# Patient Record
Sex: FEMALE | Race: BLACK OR AFRICAN AMERICAN | NOT HISPANIC OR LATINO | Employment: FULL TIME | URBAN - METROPOLITAN AREA
[De-identification: names, ages, dates, MRNs, and addresses within clinical notes are randomized per-mention and may not be internally consistent; named-entity substitution may affect disease eponyms.]

---

## 2018-01-31 ENCOUNTER — OFFICE VISIT CONVERTED (OUTPATIENT)
Dept: ORTHOPEDIC SURGERY | Facility: CLINIC | Age: 58
End: 2018-01-31
Attending: ORTHOPAEDIC SURGERY

## 2018-11-29 ENCOUNTER — CONVERSION ENCOUNTER (OUTPATIENT)
Dept: GENERAL RADIOLOGY | Facility: HOSPITAL | Age: 58
End: 2018-11-29

## 2019-02-20 ENCOUNTER — HOSPITAL ENCOUNTER (OUTPATIENT)
Dept: GENERAL RADIOLOGY | Facility: HOSPITAL | Age: 59
Discharge: HOME | End: 2019-02-20
Attending: NURSE PRACTITIONER

## 2019-07-09 ENCOUNTER — HOSPITAL ENCOUNTER (OUTPATIENT)
Dept: GENERAL RADIOLOGY | Facility: HOSPITAL | Age: 59
Discharge: HOME | End: 2019-07-09
Admitting: ORTHOPAEDIC SURGERY

## 2019-07-09 ENCOUNTER — HOSPITAL ENCOUNTER (OUTPATIENT)
Dept: GENERAL RADIOLOGY | Facility: HOSPITAL | Age: 59
Discharge: HOME | End: 2019-07-09

## 2019-07-09 ENCOUNTER — APPOINTMENT (OUTPATIENT)
Dept: PREADMISSION TESTING | Facility: HOSPITAL | Age: 59
End: 2019-07-09

## 2019-07-09 VITALS
SYSTOLIC BLOOD PRESSURE: 114 MMHG | DIASTOLIC BLOOD PRESSURE: 77 MMHG | BODY MASS INDEX: 31.37 KG/M2 | HEART RATE: 90 BPM | HEIGHT: 68 IN | OXYGEN SATURATION: 98 % | TEMPERATURE: 98.2 F | RESPIRATION RATE: 16 BRPM | WEIGHT: 207 LBS

## 2019-07-09 LAB
ABO GROUP BLD: NORMAL
ALBUMIN SERPL-MCNC: 4.2 G/DL (ref 3.5–5.2)
ALBUMIN/GLOB SERPL: 1.4 G/DL
ALP SERPL-CCNC: 135 U/L (ref 39–117)
ALT SERPL W P-5'-P-CCNC: 32 U/L (ref 1–33)
ANION GAP SERPL CALCULATED.3IONS-SCNC: 11.8 MMOL/L (ref 5–15)
APTT PPP: 29.7 SECONDS (ref 22.7–35.4)
AST SERPL-CCNC: 23 U/L (ref 1–32)
BACTERIA UR QL AUTO: ABNORMAL /HPF
BASOPHILS # BLD AUTO: 0.04 10*3/MM3 (ref 0–0.2)
BASOPHILS NFR BLD AUTO: 0.4 % (ref 0–1.5)
BILIRUB SERPL-MCNC: 0.2 MG/DL (ref 0.2–1.2)
BILIRUB UR QL STRIP: NEGATIVE
BLD GP AB SCN SERPL QL: NEGATIVE
BUN BLD-MCNC: 19 MG/DL (ref 6–20)
BUN/CREAT SERPL: 25 (ref 7–25)
CALCIUM SPEC-SCNC: 9.6 MG/DL (ref 8.6–10.5)
CHLORIDE SERPL-SCNC: 105 MMOL/L (ref 98–107)
CLARITY UR: CLEAR
CO2 SERPL-SCNC: 27.2 MMOL/L (ref 22–29)
COLOR UR: YELLOW
CREAT BLD-MCNC: 0.76 MG/DL (ref 0.57–1)
DEPRECATED RDW RBC AUTO: 55.9 FL (ref 37–54)
EOSINOPHIL # BLD AUTO: 0.14 10*3/MM3 (ref 0–0.4)
EOSINOPHIL NFR BLD AUTO: 1.6 % (ref 0.3–6.2)
ERYTHROCYTE [DISTWIDTH] IN BLOOD BY AUTOMATED COUNT: 18.3 % (ref 12.3–15.4)
GFR SERPL CREATININE-BSD FRML MDRD: 95 ML/MIN/1.73
GLOBULIN UR ELPH-MCNC: 2.9 GM/DL
GLUCOSE BLD-MCNC: 102 MG/DL (ref 65–99)
GLUCOSE UR STRIP-MCNC: NEGATIVE MG/DL
HBA1C MFR BLD: 5.9 % (ref 4.8–5.6)
HCT VFR BLD AUTO: 43.8 % (ref 34–46.6)
HGB BLD-MCNC: 13.4 G/DL (ref 12–15.9)
HGB UR QL STRIP.AUTO: NEGATIVE
HYALINE CASTS UR QL AUTO: ABNORMAL /LPF
IMM GRANULOCYTES # BLD AUTO: 0.02 10*3/MM3 (ref 0–0.05)
IMM GRANULOCYTES NFR BLD AUTO: 0.2 % (ref 0–0.5)
INR PPP: 0.91 (ref 0.9–1.1)
KETONES UR QL STRIP: NEGATIVE
LEUKOCYTE ESTERASE UR QL STRIP.AUTO: ABNORMAL
LYMPHOCYTES # BLD AUTO: 2.88 10*3/MM3 (ref 0.7–3.1)
LYMPHOCYTES NFR BLD AUTO: 32.1 % (ref 19.6–45.3)
MCH RBC QN AUTO: 26.2 PG (ref 26.6–33)
MCHC RBC AUTO-ENTMCNC: 30.6 G/DL (ref 31.5–35.7)
MCV RBC AUTO: 85.7 FL (ref 79–97)
MONOCYTES # BLD AUTO: 0.6 10*3/MM3 (ref 0.1–0.9)
MONOCYTES NFR BLD AUTO: 6.7 % (ref 5–12)
NEUTROPHILS # BLD AUTO: 5.3 10*3/MM3 (ref 1.7–7)
NEUTROPHILS NFR BLD AUTO: 59 % (ref 42.7–76)
NITRITE UR QL STRIP: NEGATIVE
NRBC BLD AUTO-RTO: 0 /100 WBC (ref 0–0.2)
PH UR STRIP.AUTO: 5.5 [PH] (ref 5–8)
PLATELET # BLD AUTO: 290 10*3/MM3 (ref 140–450)
PMV BLD AUTO: 10.6 FL (ref 6–12)
POTASSIUM BLD-SCNC: 3.9 MMOL/L (ref 3.5–5.2)
PROT SERPL-MCNC: 7.1 G/DL (ref 6–8.5)
PROT UR QL STRIP: NEGATIVE
PROTHROMBIN TIME: 11.9 SECONDS (ref 11.7–14.2)
RBC # BLD AUTO: 5.11 10*6/MM3 (ref 3.77–5.28)
RBC # UR: ABNORMAL /HPF
REF LAB TEST METHOD: ABNORMAL
RH BLD: POSITIVE
SODIUM BLD-SCNC: 144 MMOL/L (ref 136–145)
SP GR UR STRIP: 1.03 (ref 1–1.03)
SQUAMOUS #/AREA URNS HPF: ABNORMAL /HPF
T&S EXPIRATION DATE: NORMAL
UROBILINOGEN UR QL STRIP: ABNORMAL
WBC NRBC COR # BLD: 8.98 10*3/MM3 (ref 3.4–10.8)
WBC UR QL AUTO: ABNORMAL /HPF

## 2019-07-09 PROCEDURE — 93005 ELECTROCARDIOGRAM TRACING: CPT

## 2019-07-09 PROCEDURE — 80053 COMPREHEN METABOLIC PANEL: CPT | Performed by: ORTHOPAEDIC SURGERY

## 2019-07-09 PROCEDURE — 36415 COLL VENOUS BLD VENIPUNCTURE: CPT

## 2019-07-09 PROCEDURE — 85025 COMPLETE CBC W/AUTO DIFF WBC: CPT | Performed by: ORTHOPAEDIC SURGERY

## 2019-07-09 PROCEDURE — 93010 ELECTROCARDIOGRAM REPORT: CPT | Performed by: INTERNAL MEDICINE

## 2019-07-09 PROCEDURE — 86901 BLOOD TYPING SEROLOGIC RH(D): CPT | Performed by: ORTHOPAEDIC SURGERY

## 2019-07-09 PROCEDURE — 83036 HEMOGLOBIN GLYCOSYLATED A1C: CPT | Performed by: ORTHOPAEDIC SURGERY

## 2019-07-09 PROCEDURE — 86900 BLOOD TYPING SEROLOGIC ABO: CPT | Performed by: ORTHOPAEDIC SURGERY

## 2019-07-09 PROCEDURE — 71046 X-RAY EXAM CHEST 2 VIEWS: CPT

## 2019-07-09 PROCEDURE — 81001 URINALYSIS AUTO W/SCOPE: CPT | Performed by: ORTHOPAEDIC SURGERY

## 2019-07-09 PROCEDURE — 86850 RBC ANTIBODY SCREEN: CPT | Performed by: ORTHOPAEDIC SURGERY

## 2019-07-09 PROCEDURE — 85610 PROTHROMBIN TIME: CPT | Performed by: ORTHOPAEDIC SURGERY

## 2019-07-09 PROCEDURE — 73560 X-RAY EXAM OF KNEE 1 OR 2: CPT

## 2019-07-09 PROCEDURE — 85730 THROMBOPLASTIN TIME PARTIAL: CPT | Performed by: ORTHOPAEDIC SURGERY

## 2019-07-09 RX ORDER — FLUTICASONE PROPIONATE 50 MCG
2 SPRAY, SUSPENSION (ML) NASAL DAILY
COMMUNITY

## 2019-07-09 RX ORDER — PRAVASTATIN SODIUM 10 MG
10 TABLET ORAL DAILY
COMMUNITY

## 2019-07-09 RX ORDER — ESOMEPRAZOLE MAGNESIUM 40 MG/1
40 CAPSULE, DELAYED RELEASE ORAL
COMMUNITY

## 2019-07-09 RX ORDER — HYDROCODONE BITARTRATE AND ACETAMINOPHEN 10; 325 MG/1; MG/1
1 TABLET ORAL EVERY 6 HOURS PRN
COMMUNITY

## 2019-07-09 RX ORDER — CETIRIZINE HYDROCHLORIDE 10 MG/1
10 TABLET ORAL DAILY
COMMUNITY

## 2019-07-09 RX ORDER — CHLORHEXIDINE GLUCONATE 500 MG/1
1 CLOTH TOPICAL TAKE AS DIRECTED
Status: ON HOLD | COMMUNITY
End: 2019-07-16

## 2019-07-09 ASSESSMENT — KOOS JR
KOOS JR SCORE: 24
KOOS JR SCORE: 24.875

## 2019-07-09 NOTE — DISCHARGE INSTRUCTIONS
Take the following medications the morning of surgery with a small sip of water:  NEXIUM    ARRIVE AT 0730.        General Instructions:  • Do not eat solid food after midnight the night before surgery.  • You may drink clear liquids day of surgery but must stop at least one hour before your hospital arrival time.  • It is beneficial for you to have a clear drink that contains carbohydrates the day of surgery.  We suggest a 12 to 20 ounce bottle of Gatorade or Powerade for non-diabetic patients or a 12 to 20 ounce bottle of G2 or Powerade Zero for diabetic patients. (Pediatric patients, are not advised to drink a 12 to 20 ounce carbohydrate drink)    Clear liquids are liquids you can see through.  Nothing red in color.     Plain water                               Sports drinks  Sodas                                   Gelatin (Jell-O)  Fruit juices without pulp such as white grape juice and apple juice  Popsicles that contain no fruit or yogurt  Tea or coffee (no cream or milk added)  Gatorade / Powerade  G2 / Powerade Zero    • Infants may have breast milk up to four hours before surgery.  • Infants drinking formula may drink formula up to six hours before surgery.   • Patients who avoid smoking, chewing tobacco and alcohol for 4 weeks prior to surgery have a reduced risk of post-operative complications.  Quit smoking as many days before surgery as you can.  • Do not smoke, use chewing tobacco or drink alcohol the day of surgery.   • If applicable bring your C-PAP/ BI-PAP machine.  • Bring any papers given to you in the doctor’s office.  • Wear clean comfortable clothes and socks.  • Do not wear contact lenses, false eyelashes or make-up.  Bring a case for your glasses.   • Bring crutches or walker if applicable.  • Remove all piercings.  Leave jewelry and any other valuables at home.  • Hair extensions with metal clips must be removed prior to surgery.  • The Pre-Admission Testing nurse will instruct you to bring  medications if unable to obtain an accurate list in Pre-Admission Testing.        If you were given a blood bank ID arm band remember to bring it with you the day of surgery.    Preventing a Surgical Site Infection:  • For 2 to 3 days before surgery, avoid shaving with a razor because the razor can irritate skin and make it easier to develop an infection.    • Any areas of open skin can increase the risk of a post-operative wound infection by allowing bacteria to enter and travel throughout the body.  Notify your surgeon if you have any skin wounds / rashes even if it is not near the expected surgical site.  The area will need assessed to determine if surgery should be delayed until it is healed.  • The night prior to surgery sleep in a clean bed with clean clothing.  Do not allow pets to sleep with you.  • Shower on the morning of surgery using a fresh bar of anti-bacterial soap (such as Dial) and clean washcloth.  Dry with a clean towel and dress in clean clothing.  • Ask your surgeon if you will be receiving antibiotics prior to surgery.  • Make sure you, your family, and all healthcare providers clean their hands with soap and water or an alcohol based hand  before caring for you or your wound.    Day of surgery:  Upon arrival, a Pre-op nurse and Anesthesiologist will review your health history, obtain vital signs, and answer questions you may have.  The only belongings needed at this time will be a list of your home medications and if applicable your C-PAP/BI-PAP machine.  If you are staying overnight your family can leave the rest of your belongings in the car and bring them to your room later.  A Pre-op nurse will start an IV and you may receive medication in preparation for surgery, including something to help you relax.  Your family will be able to see you in the Pre-op area.  While you are in surgery your family should notify the waiting room  if they leave the waiting room area and  provide a contact phone number.    Please be aware that surgery does come with discomfort.  We want to make every effort to control your discomfort so please discuss any uncontrolled symptoms with your nurse.   Your doctor will most likely have prescribed pain medications.      If you are going home after surgery you will receive individualized written care instructions before being discharged.  A responsible adult must drive you to and from the hospital on the day of your surgery and stay with you for 24 hours.    If you are staying overnight following surgery, you will be transported to your hospital room following the recovery period.  [facility] has all private rooms.    You have received a list of surgical assistants for your reference.  If you have any questions please call Pre-Admission Testing at [phone].  Deductibles and co-payments are collected on the day of service. Please be prepared to pay the required co-pay, deductible or deposit on the day of service as defined by your plan.    2% CHLORAHEXIDINE GLUCONATE* CLOTH  Preparing or “prepping” skin before surgery can reduce the risk of infection at the surgical site. To make the process easier, [facility] has chosen disposable cloths moistened with a rinse-free, 2% Chlorhexidine Gluconate (CHG) antiseptic solution. The steps below outline the prepping process and should be carefully followed.        Use the prep cloth on the area that is circled in the diagram             Directions Night before Surgery  1) Shower using a fresh bar of anti-bacterial soap (such as Dial) and clean washcloth.  Use a clean towel to completely dry your skin.  2) Do not use any lotions, oils or creams on your skin.  3) Open the package and remove 1 cloth, wipe your skin for 30 seconds in a circular motion.  Allow to dry for 3 minutes.  4) Repeat #3 with second cloth.  5) Do not touch your eyes, ears, or mouth with the prep cloth.  6) Allow the wet  prep solution to air dry.  7) Discard the prep cloth and wash your hands with soap and water.   8) Dress in clean bed clothes and sleep on fresh clean bed sheets.   9) You may experience some temporary itching after the prep.    Directions Day of Surgery  1) Repeat steps 1,2,3,4,5,6,7, and 9.   2) Dress in clean clothes before coming to the hospital.    BACTROBAN NASAL OINTMENT  There are many germs normally in your nose. Bactroban is an ointment that will help reduce these germs. Please follow these instructions for Bactroban use:      ____The day before surgery in the morning  Date________    ____The day before surgery in the evening              Date________    ____The day of surgery in the morning    Date________    **Squirt ½ package of Bactroban Ointment onto a cotton applicator and apply to inside of 1st nostril.  Squirt the remaining Bactroban and apply to the inside of the other nostril.

## 2019-07-16 ENCOUNTER — ANESTHESIA EVENT (OUTPATIENT)
Dept: PERIOP | Facility: HOSPITAL | Age: 59
End: 2019-07-16

## 2019-07-16 ENCOUNTER — APPOINTMENT (OUTPATIENT)
Dept: GENERAL RADIOLOGY | Facility: HOSPITAL | Age: 59
End: 2019-07-16

## 2019-07-16 ENCOUNTER — HOSPITAL ENCOUNTER (INPATIENT)
Facility: HOSPITAL | Age: 59
LOS: 1 days | Discharge: HOME HEALTH | End: 2019-07-17
Attending: ORTHOPAEDIC SURGERY | Admitting: ORTHOPAEDIC SURGERY

## 2019-07-16 ENCOUNTER — ANESTHESIA (OUTPATIENT)
Dept: PERIOP | Facility: HOSPITAL | Age: 59
End: 2019-07-16

## 2019-07-16 DIAGNOSIS — R26.89 DECREASED MOBILITY: Primary | ICD-10-CM

## 2019-07-16 PROBLEM — Z96.651 STATUS POST TOTAL KNEE REPLACEMENT, RIGHT: Status: ACTIVE | Noted: 2019-07-16

## 2019-07-16 LAB
GLUCOSE BLDC GLUCOMTR-MCNC: 106 MG/DL (ref 70–130)
GLUCOSE BLDC GLUCOMTR-MCNC: 171 MG/DL (ref 70–130)

## 2019-07-16 PROCEDURE — 99254 IP/OBS CNSLTJ NEW/EST MOD 60: CPT | Performed by: INTERNAL MEDICINE

## 2019-07-16 PROCEDURE — 25010000002 MIDAZOLAM PER 1 MG: Performed by: ANESTHESIOLOGY

## 2019-07-16 PROCEDURE — 25010000003 BUPIVACAINE LIPOSOME 1.3 % SUSPENSION 20 ML VIAL: Performed by: ORTHOPAEDIC SURGERY

## 2019-07-16 PROCEDURE — 25010000002 PROMETHAZINE PER 50 MG: Performed by: ORTHOPAEDIC SURGERY

## 2019-07-16 PROCEDURE — 0SRC0J9 REPLACEMENT OF RIGHT KNEE JOINT WITH SYNTHETIC SUBSTITUTE, CEMENTED, OPEN APPROACH: ICD-10-PCS | Performed by: ORTHOPAEDIC SURGERY

## 2019-07-16 PROCEDURE — 25010000002 HYDROMORPHONE PER 4 MG: Performed by: NURSE ANESTHETIST, CERTIFIED REGISTERED

## 2019-07-16 PROCEDURE — 82962 GLUCOSE BLOOD TEST: CPT

## 2019-07-16 PROCEDURE — 25010000002 FENTANYL CITRATE (PF) 100 MCG/2ML SOLUTION: Performed by: NURSE ANESTHETIST, CERTIFIED REGISTERED

## 2019-07-16 PROCEDURE — C1776 JOINT DEVICE (IMPLANTABLE): HCPCS | Performed by: ORTHOPAEDIC SURGERY

## 2019-07-16 PROCEDURE — 25010000002 ONDANSETRON PER 1 MG: Performed by: NURSE ANESTHETIST, CERTIFIED REGISTERED

## 2019-07-16 PROCEDURE — C1713 ANCHOR/SCREW BN/BN,TIS/BN: HCPCS | Performed by: ORTHOPAEDIC SURGERY

## 2019-07-16 PROCEDURE — 73560 X-RAY EXAM OF KNEE 1 OR 2: CPT

## 2019-07-16 PROCEDURE — 25010000002 PROPOFOL 10 MG/ML EMULSION: Performed by: NURSE ANESTHETIST, CERTIFIED REGISTERED

## 2019-07-16 PROCEDURE — C9290 INJ, BUPIVACAINE LIPOSOME: HCPCS | Performed by: ORTHOPAEDIC SURGERY

## 2019-07-16 PROCEDURE — 25010000002 DEXAMETHASONE PER 1 MG: Performed by: NURSE ANESTHETIST, CERTIFIED REGISTERED

## 2019-07-16 PROCEDURE — 25010000002 FENTANYL CITRATE (PF) 100 MCG/2ML SOLUTION: Performed by: ANESTHESIOLOGY

## 2019-07-16 PROCEDURE — 63710000001 INSULIN LISPRO (HUMAN) PER 5 UNITS: Performed by: ORTHOPAEDIC SURGERY

## 2019-07-16 PROCEDURE — 25010000002 HYDROMORPHONE PER 4 MG: Performed by: ORTHOPAEDIC SURGERY

## 2019-07-16 PROCEDURE — 25010000002 ROPIVACAINE PER 1 MG: Performed by: ANESTHESIOLOGY

## 2019-07-16 PROCEDURE — 25010000002 HYDRALAZINE PER 20 MG: Performed by: NURSE ANESTHETIST, CERTIFIED REGISTERED

## 2019-07-16 PROCEDURE — 25010000003 CEFAZOLIN IN DEXTROSE 2-4 GM/100ML-% SOLUTION: Performed by: ORTHOPAEDIC SURGERY

## 2019-07-16 DEVICE — CAP EXT STEM KN UPCHRG: Type: IMPLANTABLE DEVICE | Site: KNEE | Status: FUNCTIONAL

## 2019-07-16 DEVICE — IMPLANTABLE DEVICE
Type: IMPLANTABLE DEVICE | Site: KNEE | Status: FUNCTIONAL
Brand: PERSONA®

## 2019-07-16 DEVICE — CAP TOTL KN CMT PREMIUM: Type: IMPLANTABLE DEVICE | Site: KNEE | Status: FUNCTIONAL

## 2019-07-16 DEVICE — CMT BONE REFOBACIN R W/GENT 1X40: Type: IMPLANTABLE DEVICE | Site: KNEE | Status: FUNCTIONAL

## 2019-07-16 DEVICE — CAP PAT KN VE/TM UPCHRG: Type: IMPLANTABLE DEVICE | Site: KNEE | Status: FUNCTIONAL

## 2019-07-16 DEVICE — CAP BEAR KN VE UPCHRG: Type: IMPLANTABLE DEVICE | Site: KNEE | Status: FUNCTIONAL

## 2019-07-16 DEVICE — IMPLANTABLE DEVICE
Type: IMPLANTABLE DEVICE | Site: KNEE | Status: FUNCTIONAL
Brand: PERSONA® VIVACIT-E®

## 2019-07-16 DEVICE — IMPLANTABLE DEVICE
Type: IMPLANTABLE DEVICE | Site: KNEE | Status: FUNCTIONAL
Brand: PERSONA® NATURAL TIBIA®

## 2019-07-16 RX ORDER — ACETAMINOPHEN 325 MG/1
650 TABLET ORAL ONCE AS NEEDED
Status: DISCONTINUED | OUTPATIENT
Start: 2019-07-16 | End: 2019-07-16 | Stop reason: HOSPADM

## 2019-07-16 RX ORDER — SENNA AND DOCUSATE SODIUM 50; 8.6 MG/1; MG/1
1 TABLET, FILM COATED ORAL 2 TIMES DAILY
Status: DISCONTINUED | OUTPATIENT
Start: 2019-07-16 | End: 2019-07-17 | Stop reason: HOSPADM

## 2019-07-16 RX ORDER — FLUTICASONE PROPIONATE 50 MCG
2 SPRAY, SUSPENSION (ML) NASAL DAILY
Status: DISCONTINUED | OUTPATIENT
Start: 2019-07-16 | End: 2019-07-17 | Stop reason: HOSPADM

## 2019-07-16 RX ORDER — HYDRALAZINE HYDROCHLORIDE 20 MG/ML
5 INJECTION INTRAMUSCULAR; INTRAVENOUS
Status: DISCONTINUED | OUTPATIENT
Start: 2019-07-16 | End: 2019-07-16 | Stop reason: HOSPADM

## 2019-07-16 RX ORDER — CEFAZOLIN SODIUM 2 G/100ML
2 INJECTION, SOLUTION INTRAVENOUS ONCE
Status: COMPLETED | OUTPATIENT
Start: 2019-07-16 | End: 2019-07-16

## 2019-07-16 RX ORDER — HYDROMORPHONE HYDROCHLORIDE 1 MG/ML
0.5 INJECTION, SOLUTION INTRAMUSCULAR; INTRAVENOUS; SUBCUTANEOUS
Status: DISCONTINUED | OUTPATIENT
Start: 2019-07-16 | End: 2019-07-17 | Stop reason: HOSPADM

## 2019-07-16 RX ORDER — MAGNESIUM HYDROXIDE 1200 MG/15ML
LIQUID ORAL AS NEEDED
Status: DISCONTINUED | OUTPATIENT
Start: 2019-07-16 | End: 2019-07-16 | Stop reason: HOSPADM

## 2019-07-16 RX ORDER — FENTANYL CITRATE 50 UG/ML
INJECTION, SOLUTION INTRAMUSCULAR; INTRAVENOUS AS NEEDED
Status: DISCONTINUED | OUTPATIENT
Start: 2019-07-16 | End: 2019-07-16 | Stop reason: SURG

## 2019-07-16 RX ORDER — DIPHENHYDRAMINE HYDROCHLORIDE 50 MG/ML
12.5 INJECTION INTRAMUSCULAR; INTRAVENOUS
Status: DISCONTINUED | OUTPATIENT
Start: 2019-07-16 | End: 2019-07-16 | Stop reason: HOSPADM

## 2019-07-16 RX ORDER — SODIUM CHLORIDE, SODIUM LACTATE, POTASSIUM CHLORIDE, CALCIUM CHLORIDE 600; 310; 30; 20 MG/100ML; MG/100ML; MG/100ML; MG/100ML
100 INJECTION, SOLUTION INTRAVENOUS CONTINUOUS
Status: ACTIVE | OUTPATIENT
Start: 2019-07-16 | End: 2019-07-17

## 2019-07-16 RX ORDER — HYDROMORPHONE HYDROCHLORIDE 1 MG/ML
0.5 INJECTION, SOLUTION INTRAMUSCULAR; INTRAVENOUS; SUBCUTANEOUS
Status: DISCONTINUED | OUTPATIENT
Start: 2019-07-16 | End: 2019-07-16 | Stop reason: HOSPADM

## 2019-07-16 RX ORDER — PROMETHAZINE HYDROCHLORIDE 25 MG/ML
12.5 INJECTION, SOLUTION INTRAMUSCULAR; INTRAVENOUS ONCE AS NEEDED
Status: DISCONTINUED | OUTPATIENT
Start: 2019-07-16 | End: 2019-07-16 | Stop reason: HOSPADM

## 2019-07-16 RX ORDER — FAMOTIDINE 10 MG/ML
20 INJECTION, SOLUTION INTRAVENOUS ONCE
Status: COMPLETED | OUTPATIENT
Start: 2019-07-16 | End: 2019-07-16

## 2019-07-16 RX ORDER — OXYCODONE AND ACETAMINOPHEN 7.5; 325 MG/1; MG/1
1 TABLET ORAL ONCE AS NEEDED
Status: DISCONTINUED | OUTPATIENT
Start: 2019-07-16 | End: 2019-07-16 | Stop reason: HOSPADM

## 2019-07-16 RX ORDER — SODIUM CHLORIDE 0.9 % (FLUSH) 0.9 %
1-10 SYRINGE (ML) INJECTION AS NEEDED
Status: DISCONTINUED | OUTPATIENT
Start: 2019-07-16 | End: 2019-07-16 | Stop reason: HOSPADM

## 2019-07-16 RX ORDER — FENTANYL CITRATE 50 UG/ML
50 INJECTION, SOLUTION INTRAMUSCULAR; INTRAVENOUS
Status: DISCONTINUED | OUTPATIENT
Start: 2019-07-16 | End: 2019-07-16 | Stop reason: HOSPADM

## 2019-07-16 RX ORDER — ONDANSETRON 2 MG/ML
4 INJECTION INTRAMUSCULAR; INTRAVENOUS EVERY 6 HOURS PRN
Status: DISCONTINUED | OUTPATIENT
Start: 2019-07-16 | End: 2019-07-17 | Stop reason: HOSPADM

## 2019-07-16 RX ORDER — HYDROMORPHONE HCL 110MG/55ML
PATIENT CONTROLLED ANALGESIA SYRINGE INTRAVENOUS AS NEEDED
Status: DISCONTINUED | OUTPATIENT
Start: 2019-07-16 | End: 2019-07-16 | Stop reason: SURG

## 2019-07-16 RX ORDER — FLUMAZENIL 0.1 MG/ML
0.2 INJECTION INTRAVENOUS AS NEEDED
Status: DISCONTINUED | OUTPATIENT
Start: 2019-07-16 | End: 2019-07-16 | Stop reason: HOSPADM

## 2019-07-16 RX ORDER — CEFAZOLIN SODIUM 2 G/100ML
2 INJECTION, SOLUTION INTRAVENOUS EVERY 8 HOURS
Status: COMPLETED | OUTPATIENT
Start: 2019-07-16 | End: 2019-07-17

## 2019-07-16 RX ORDER — SODIUM CHLORIDE, SODIUM LACTATE, POTASSIUM CHLORIDE, CALCIUM CHLORIDE 600; 310; 30; 20 MG/100ML; MG/100ML; MG/100ML; MG/100ML
9 INJECTION, SOLUTION INTRAVENOUS CONTINUOUS
Status: DISCONTINUED | OUTPATIENT
Start: 2019-07-16 | End: 2019-07-16

## 2019-07-16 RX ORDER — MULTIPLE VITAMINS W/ MINERALS TAB 9MG-400MCG
1 TAB ORAL DAILY
Status: DISCONTINUED | OUTPATIENT
Start: 2019-07-16 | End: 2019-07-17 | Stop reason: HOSPADM

## 2019-07-16 RX ORDER — PROMETHAZINE HYDROCHLORIDE 25 MG/ML
6.25 INJECTION, SOLUTION INTRAMUSCULAR; INTRAVENOUS
Status: DISCONTINUED | OUTPATIENT
Start: 2019-07-16 | End: 2019-07-16 | Stop reason: HOSPADM

## 2019-07-16 RX ORDER — PROMETHAZINE HYDROCHLORIDE 25 MG/1
25 SUPPOSITORY RECTAL ONCE AS NEEDED
Status: DISCONTINUED | OUTPATIENT
Start: 2019-07-16 | End: 2019-07-16 | Stop reason: HOSPADM

## 2019-07-16 RX ORDER — LIDOCAINE HYDROCHLORIDE 20 MG/ML
INJECTION, SOLUTION INFILTRATION; PERINEURAL AS NEEDED
Status: DISCONTINUED | OUTPATIENT
Start: 2019-07-16 | End: 2019-07-16 | Stop reason: SURG

## 2019-07-16 RX ORDER — PRAVASTATIN SODIUM 10 MG
10 TABLET ORAL DAILY
Status: DISCONTINUED | OUTPATIENT
Start: 2019-07-16 | End: 2019-07-17 | Stop reason: HOSPADM

## 2019-07-16 RX ORDER — LIDOCAINE HYDROCHLORIDE 10 MG/ML
0.5 INJECTION, SOLUTION EPIDURAL; INFILTRATION; INTRACAUDAL; PERINEURAL ONCE AS NEEDED
Status: DISCONTINUED | OUTPATIENT
Start: 2019-07-16 | End: 2019-07-16 | Stop reason: HOSPADM

## 2019-07-16 RX ORDER — OXYCODONE AND ACETAMINOPHEN 7.5; 325 MG/1; MG/1
2 TABLET ORAL EVERY 4 HOURS PRN
Status: DISCONTINUED | OUTPATIENT
Start: 2019-07-16 | End: 2019-07-17 | Stop reason: HOSPADM

## 2019-07-16 RX ORDER — OXYCODONE AND ACETAMINOPHEN 7.5; 325 MG/1; MG/1
1 TABLET ORAL EVERY 4 HOURS PRN
Status: DISCONTINUED | OUTPATIENT
Start: 2019-07-16 | End: 2019-07-17 | Stop reason: HOSPADM

## 2019-07-16 RX ORDER — ONDANSETRON 4 MG/1
4 TABLET, FILM COATED ORAL EVERY 6 HOURS PRN
Status: DISCONTINUED | OUTPATIENT
Start: 2019-07-16 | End: 2019-07-17 | Stop reason: HOSPADM

## 2019-07-16 RX ORDER — NICOTINE POLACRILEX 4 MG
15 LOZENGE BUCCAL
Status: DISCONTINUED | OUTPATIENT
Start: 2019-07-16 | End: 2019-07-17 | Stop reason: HOSPADM

## 2019-07-16 RX ORDER — PANTOPRAZOLE SODIUM 40 MG/1
40 TABLET, DELAYED RELEASE ORAL EVERY MORNING
Status: DISCONTINUED | OUTPATIENT
Start: 2019-07-17 | End: 2019-07-17 | Stop reason: HOSPADM

## 2019-07-16 RX ORDER — LABETALOL HYDROCHLORIDE 5 MG/ML
5 INJECTION, SOLUTION INTRAVENOUS
Status: DISCONTINUED | OUTPATIENT
Start: 2019-07-16 | End: 2019-07-16 | Stop reason: HOSPADM

## 2019-07-16 RX ORDER — PROMETHAZINE HYDROCHLORIDE 25 MG/ML
12.5 INJECTION, SOLUTION INTRAMUSCULAR; INTRAVENOUS EVERY 6 HOURS PRN
Status: DISCONTINUED | OUTPATIENT
Start: 2019-07-16 | End: 2019-07-17 | Stop reason: HOSPADM

## 2019-07-16 RX ORDER — ONDANSETRON 2 MG/ML
4 INJECTION INTRAMUSCULAR; INTRAVENOUS ONCE AS NEEDED
Status: DISCONTINUED | OUTPATIENT
Start: 2019-07-16 | End: 2019-07-16 | Stop reason: HOSPADM

## 2019-07-16 RX ORDER — ONDANSETRON 2 MG/ML
INJECTION INTRAMUSCULAR; INTRAVENOUS AS NEEDED
Status: DISCONTINUED | OUTPATIENT
Start: 2019-07-16 | End: 2019-07-16 | Stop reason: SURG

## 2019-07-16 RX ORDER — HYDROCODONE BITARTRATE AND ACETAMINOPHEN 7.5; 325 MG/1; MG/1
1 TABLET ORAL ONCE AS NEEDED
Status: DISCONTINUED | OUTPATIENT
Start: 2019-07-16 | End: 2019-07-16 | Stop reason: HOSPADM

## 2019-07-16 RX ORDER — ROPIVACAINE HYDROCHLORIDE 5 MG/ML
INJECTION, SOLUTION EPIDURAL; INFILTRATION; PERINEURAL
Status: COMPLETED | OUTPATIENT
Start: 2019-07-16 | End: 2019-07-16

## 2019-07-16 RX ORDER — DEXTROSE MONOHYDRATE 25 G/50ML
25 INJECTION, SOLUTION INTRAVENOUS
Status: DISCONTINUED | OUTPATIENT
Start: 2019-07-16 | End: 2019-07-17 | Stop reason: HOSPADM

## 2019-07-16 RX ORDER — PROPOFOL 10 MG/ML
VIAL (ML) INTRAVENOUS AS NEEDED
Status: DISCONTINUED | OUTPATIENT
Start: 2019-07-16 | End: 2019-07-16 | Stop reason: SURG

## 2019-07-16 RX ORDER — MELATONIN
5000 DAILY
Status: DISCONTINUED | OUTPATIENT
Start: 2019-07-16 | End: 2019-07-17 | Stop reason: HOSPADM

## 2019-07-16 RX ORDER — PROMETHAZINE HYDROCHLORIDE 25 MG/1
25 TABLET ORAL ONCE AS NEEDED
Status: DISCONTINUED | OUTPATIENT
Start: 2019-07-16 | End: 2019-07-16 | Stop reason: HOSPADM

## 2019-07-16 RX ORDER — CETIRIZINE HYDROCHLORIDE 10 MG/1
10 TABLET ORAL DAILY
Status: DISCONTINUED | OUTPATIENT
Start: 2019-07-16 | End: 2019-07-17 | Stop reason: HOSPADM

## 2019-07-16 RX ORDER — EPHEDRINE SULFATE 50 MG/ML
5 INJECTION, SOLUTION INTRAVENOUS ONCE AS NEEDED
Status: DISCONTINUED | OUTPATIENT
Start: 2019-07-16 | End: 2019-07-16 | Stop reason: HOSPADM

## 2019-07-16 RX ORDER — MIDAZOLAM HYDROCHLORIDE 1 MG/ML
1 INJECTION INTRAMUSCULAR; INTRAVENOUS
Status: DISCONTINUED | OUTPATIENT
Start: 2019-07-16 | End: 2019-07-16 | Stop reason: HOSPADM

## 2019-07-16 RX ORDER — NALOXONE HCL 0.4 MG/ML
0.2 VIAL (ML) INJECTION AS NEEDED
Status: DISCONTINUED | OUTPATIENT
Start: 2019-07-16 | End: 2019-07-16 | Stop reason: HOSPADM

## 2019-07-16 RX ORDER — TRANEXAMIC ACID 100 MG/ML
INJECTION, SOLUTION INTRAVENOUS AS NEEDED
Status: DISCONTINUED | OUTPATIENT
Start: 2019-07-16 | End: 2019-07-16 | Stop reason: HOSPADM

## 2019-07-16 RX ORDER — DIAZEPAM 5 MG/1
5 TABLET ORAL EVERY 6 HOURS PRN
Status: DISCONTINUED | OUTPATIENT
Start: 2019-07-16 | End: 2019-07-17 | Stop reason: HOSPADM

## 2019-07-16 RX ORDER — DIPHENHYDRAMINE HCL 25 MG
25 CAPSULE ORAL
Status: DISCONTINUED | OUTPATIENT
Start: 2019-07-16 | End: 2019-07-16 | Stop reason: HOSPADM

## 2019-07-16 RX ORDER — DEXAMETHASONE SODIUM PHOSPHATE 10 MG/ML
INJECTION INTRAMUSCULAR; INTRAVENOUS AS NEEDED
Status: DISCONTINUED | OUTPATIENT
Start: 2019-07-16 | End: 2019-07-16 | Stop reason: SURG

## 2019-07-16 RX ORDER — MIDAZOLAM HYDROCHLORIDE 1 MG/ML
2 INJECTION INTRAMUSCULAR; INTRAVENOUS
Status: DISCONTINUED | OUTPATIENT
Start: 2019-07-16 | End: 2019-07-16 | Stop reason: HOSPADM

## 2019-07-16 RX ORDER — NALOXONE HCL 0.4 MG/ML
0.1 VIAL (ML) INJECTION
Status: DISCONTINUED | OUTPATIENT
Start: 2019-07-16 | End: 2019-07-17 | Stop reason: HOSPADM

## 2019-07-16 RX ADMIN — PROMETHAZINE HYDROCHLORIDE 12.5 MG: 25 INJECTION INTRAMUSCULAR; INTRAVENOUS at 18:59

## 2019-07-16 RX ADMIN — CEFAZOLIN SODIUM 2 G: 2 INJECTION, SOLUTION INTRAVENOUS at 22:25

## 2019-07-16 RX ADMIN — ROPIVACAINE HYDROCHLORIDE 20 ML: 5 INJECTION, SOLUTION EPIDURAL; INFILTRATION; PERINEURAL at 13:55

## 2019-07-16 RX ADMIN — FENTANYL CITRATE 50 MCG: 50 INJECTION INTRAMUSCULAR; INTRAVENOUS at 17:27

## 2019-07-16 RX ADMIN — MIDAZOLAM 1 MG: 1 INJECTION INTRAMUSCULAR; INTRAVENOUS at 13:50

## 2019-07-16 RX ADMIN — PRAVASTATIN SODIUM 10 MG: 10 TABLET ORAL at 22:18

## 2019-07-16 RX ADMIN — SENNOSIDES,DOCUSATE SODIUM 1 TABLET: 50; 8.6 TABLET, FILM COATED ORAL at 22:19

## 2019-07-16 RX ADMIN — ONDANSETRON 4 MG: 2 INJECTION INTRAMUSCULAR; INTRAVENOUS at 16:03

## 2019-07-16 RX ADMIN — DEXAMETHASONE SODIUM PHOSPHATE 8 MG: 10 INJECTION INTRAMUSCULAR; INTRAVENOUS at 14:44

## 2019-07-16 RX ADMIN — FENTANYL CITRATE 50 MCG: 50 INJECTION, SOLUTION INTRAMUSCULAR; INTRAVENOUS at 16:15

## 2019-07-16 RX ADMIN — FENTANYL CITRATE 50 MCG: 50 INJECTION, SOLUTION INTRAMUSCULAR; INTRAVENOUS at 16:10

## 2019-07-16 RX ADMIN — OXYCODONE HYDROCHLORIDE AND ACETAMINOPHEN 2 TABLET: 7.5; 325 TABLET ORAL at 22:19

## 2019-07-16 RX ADMIN — HYDRALAZINE HYDROCHLORIDE 5 MG: 20 INJECTION INTRAMUSCULAR; INTRAVENOUS at 16:59

## 2019-07-16 RX ADMIN — HYDRALAZINE HYDROCHLORIDE 5 MG: 20 INJECTION INTRAMUSCULAR; INTRAVENOUS at 16:43

## 2019-07-16 RX ADMIN — PROPOFOL 200 MG: 10 INJECTION, EMULSION INTRAVENOUS at 14:39

## 2019-07-16 RX ADMIN — FENTANYL CITRATE 50 MCG: 50 INJECTION, SOLUTION INTRAMUSCULAR; INTRAVENOUS at 14:47

## 2019-07-16 RX ADMIN — CEFAZOLIN SODIUM 2 G: 2 INJECTION, SOLUTION INTRAVENOUS at 14:34

## 2019-07-16 RX ADMIN — SODIUM CHLORIDE, POTASSIUM CHLORIDE, SODIUM LACTATE AND CALCIUM CHLORIDE 9 ML/HR: 600; 310; 30; 20 INJECTION, SOLUTION INTRAVENOUS at 12:41

## 2019-07-16 RX ADMIN — HYDROMORPHONE HYDROCHLORIDE 1 MG: 2 INJECTION INTRAMUSCULAR; INTRAVENOUS; SUBCUTANEOUS at 15:04

## 2019-07-16 RX ADMIN — FENTANYL CITRATE 100 MCG: 50 INJECTION, SOLUTION INTRAMUSCULAR; INTRAVENOUS at 14:36

## 2019-07-16 RX ADMIN — FAMOTIDINE 20 MG: 10 INJECTION INTRAVENOUS at 13:32

## 2019-07-16 RX ADMIN — INSULIN LISPRO 2 UNITS: 100 INJECTION, SOLUTION INTRAVENOUS; SUBCUTANEOUS at 22:19

## 2019-07-16 RX ADMIN — LIDOCAINE HYDROCHLORIDE 100 MG: 20 INJECTION, SOLUTION INFILTRATION; PERINEURAL at 14:39

## 2019-07-16 RX ADMIN — SODIUM CHLORIDE, POTASSIUM CHLORIDE, SODIUM LACTATE AND CALCIUM CHLORIDE: 600; 310; 30; 20 INJECTION, SOLUTION INTRAVENOUS at 16:16

## 2019-07-16 RX ADMIN — FENTANYL CITRATE 50 MCG: 50 INJECTION INTRAMUSCULAR; INTRAVENOUS at 13:50

## 2019-07-16 RX ADMIN — MIDAZOLAM 2 MG: 1 INJECTION INTRAMUSCULAR; INTRAVENOUS at 13:31

## 2019-07-16 RX ADMIN — HYDROMORPHONE HYDROCHLORIDE 0.5 MG: 1 INJECTION, SOLUTION INTRAMUSCULAR; INTRAVENOUS; SUBCUTANEOUS at 18:59

## 2019-07-16 NOTE — PLAN OF CARE
Problem: Patient Care Overview  Goal: Plan of Care Review  Outcome: Ongoing (interventions implemented as appropriate)   07/16/19 6311   Coping/Psychosocial   Plan of Care Reviewed With patient   Plan of Care Review   Progress improving   OTHER   Outcome Summary post op pain and nausea, ambulating assist of 1 with walker to BRP, BMx1-diarrhea, NVI, VSS, NVI, educated on bp meds and monitoring     Goal: Individualization and Mutuality  Outcome: Ongoing (interventions implemented as appropriate)      Problem: Fall Risk (Adult)  Goal: Identify Related Risk Factors and Signs and Symptoms  Outcome: Outcome(s) achieved Date Met: 07/16/19    Goal: Absence of Fall  Outcome: Ongoing (interventions implemented as appropriate)      Problem: Knee Arthroplasty (Total, Partial) (Adult)  Goal: Signs and Symptoms of Listed Potential Problems Will be Absent, Minimized or Managed (Knee Arthroplasty)  Outcome: Ongoing (interventions implemented as appropriate)    Goal: Anesthesia/Sedation Recovery  Outcome: Ongoing (interventions implemented as appropriate)

## 2019-07-16 NOTE — OP NOTE
TOTAL KNEE ARTHROPLASTY  Procedure Note    Ken Galo  7/16/2019    Pre-op Diagnosis:  Right Knee Primary Generalized Osteoarthritis and Rheumatoid Arthritis  Post-op Diagnosis:  Same  Procedure:  Right Total Knee Arthroplasty  Surgeon:  Harshal Stokes MD  Anesthesia: General with Block, Anesthesiologist: Bethel Saab MD  CRNA: Gracy Daniel CRNA  Staff: Circulator: Savi Cat RN; Macario Combs RN  Scrub Person: Eleazar Walter  Vendor Representative: Nicolas More  Assistant: Derrick Haines DO CFA  Estimated Blood Loss: 100ml  Specimens: * No orders in the log *  Drains: none  Complications: None    Components Utilized:   [employer]  Implant Name Type Inv. Item Serial No.  Lot No. LRB No. Used   CMT BONE REFOBACIN R W/GENT 1X40 - [id number] Implant CMT BONE REFOBACIN R W/GENT 1X40  [employer] [id number] Right 2   EXT STEM PERSONA TPR [id number] - [id number] Implant EXT STEM PERSONA TPR [id number]  [employer] [id number] Right 1   STEM TIB PERSONA CMT 5D VIVI RT - [id number] Implant STEM TIB PERSONA CMT 5D VIVI RT  [employer] [id number] Right 1   PAT KN PERSONA VE CRS/LNK CMT 35MM - [id number] Implant PAT KN PERSONA VE CRS/LNK CMT 35MM  [employer] [id number] Right 1   COMP FEM PERSONA CR CMT COCR STD SZ8 RT - [id number] Implant COMP FEM PERSONA CR CMT COCR STD SZ8 RT  [employer] [id number] Right 1   ART/SRF KN PERSONA/VE MC EF 8TO11 11MM RT - [id number] Implant ART/SRF KN PERSONA/VE MC EF 8TO11 11MM RT  [employer] [id number] Right 1         Indication for Procedure:  The patient is a 58 y.o. female presents today for a total knee arthroplasty procedure because of failure to conservatively manage the patients pain for arthritis.  The patient was educated in risks of surgery that could include possible risk of infection, deep venous thrombosis, pulmonary embolism, fracture, neurovascular injury, leg length discrepancy, dislocation, possible persistent pain,  need for additional surgeries, anesthetic risks, medical risks including heart attack and stroke, and death.  The discussion occurred in the office pre-operatively, and patient had the opportunity to ask questions, and concerns about the proposed surgery.  The patient wished to proceed.      Protocols for intravenous antibiotics and venous thrombosis were followed for this patient.  IV antibiotics were infused prior to surgery and will be discontinued within 24 hours of completion of the surgical procedure.  Thrombosis prophylaxis will be initiated within 24 hours of the completion of the surgical procedure.      Procedure:  After the patient was identified in the preoperative area, and the surgical site confirmed and marked, the patient was brought to the operating room on a stretcher.  They were placed supine on the operating room table and the above anesthetic was placed uneventfully.  A time-out procedure was performed.  The intravenous antibiotics infusion was completed.  A non-sterile tourniquet was placed on the operative thigh, and was prepped and draped in the usual sterile fashion.  An Esmarch was used to exsanguinate the limb, and the tourniquet was inflated.      A 10 blade scalpel was used to make a longitudinal incision from above the patella to medial to the tibial tubercle.  A medial courtney-patellar arthrotomy was performed with another 10 blade scalpel.  The medial joint line was elevated subperiosteally with electrocautery and a riggs elevator.  The patellar fat pad was removed.  The patella was everted and osteotomy cut completed with oscillating saw.  The patella guide and drill was used to finish preparing the patella.  A patella protecting guide was placed, and the patella was everted off the side of the femur laterally.      The knee was then flexed and Ani's line was identified.  The drill was placed into the distal femur into the medullary canal.  The intramedullary distal femoral valgus  cutting guide set to 5 degrees of femoral valgus with +1 mm cut.  It was then placed into the medullary canal.  It was pinned and the oscillating saw was used to make the osteotomy.  The anterior referencing distal femoral guide was placed and the above femoral size was measured.  Three degrees of external rotation was set.  The 4 in 1 femoral cutting guide was placed and pinned and the oscillating saw was used to make the appropriate cuts.      The extramedullary cutting guide was placed aligned with the tibial eminence superiorly and the tibial shaft distally, pinned 4 mm from the medial tibial plateau.  Tibial slope was accounted for.  The oscillating saw was used to complete the osteotomy cuts.    A laminar  was placed medially and then laterally to remove the medial and lateral meniscus and the posterior osteophytes.  The tibial baseplate was placed on the tibial surface with a drop misha to confirm an appropriate cut and size of implant.  It was then pinned externally rotated to the medial third of the tibial tubercle.  The knee was a little tight in flexion, therefore the PCL was sectioned.  This balanced the flexion and extension gaps.  The trial reduction was performed with the above implants and was found to be stable in all planes.  The patella tracked centrally on the trochlear groove.     The lug holes were drilled in the distal femur.  The tibia was drilled and broached in the typical fashion accounting for a stem extension.  The trial components were removed and the final implants were confirmed and opened.  The bone surfaces were then irrigated and dried.  Jasiel antibiotic cement was mixed and placed on the cut bone surfaces and back side of the implants.  The implants were impacted into place, and the trial polyethylene spacer was placed.  The knee was placed into extension.  A stack of towels was placed under the ankle and the cement was allowed to harden. The tourniquet was then dropped.   Hemostasis was obtained.  The wound was then irrigated with the pulse lavage irrigation system with a 3 liter mixture of bacitracin and normal saline.  The local anesthetic mixture was injected throughout the knee for post-operative pain control.  The final polyethylene implant was then inserted and the knee was flexed to 90 degrees.  The arthrotomy was closed with #1 Vicryl suture and #1 Strata-fix suture.  After arthrotomy closure, 10 mL of tranexamic Acid was injected into the knee joint.  The deep dermis was closed with #1 Strata-fix suture, and the skin was closed with 3-0 Stratafix suture.  Dermabond, Telfa, Tegaderm, and Ace bandage were applied to the knee.    Sponge and needle counts were completed and were correct.  The patient was awakened from anesthetic and was returned to recovery in stable condition.    Postoperative Plan:  The patient will be admitted.  They will be started on Eliquis 2.5 mg BID x 2 weeks for dvt prophylaxis and have sequential compression devices.  They will be on a 24 hour antibiotic protocol.  They will be weight bearing as tolerated with physical therapy.    No complications were encountered during this surgical procedure.      Harshal Stokes MD     Date: 7/16/2019  Time: 4:10 PM

## 2019-07-16 NOTE — CONSULTS
Cardiology History & Physical / Consultation      Patient Name: Ken Galo  Age/Sex: 58 y.o. female  : 1960  MRN: [id number]    Date of Admission: 2019  Date of Encounter Visit: 19  Encounter Provider: Ave Graham RN  Referring Provider: Harshal Stokes MD  Place of Service: [facility] CARDIOLOGY  Patient Care Team:  Una Mistry APRN as PCP - General (Nurse Practitioner)          Subjective:     Chief Complaint: total right knee arthroplasty    Reason for consultation: abnormal preoperative EKG    History of Present Illness:  Ken Galo is a   58-year-old F American female who presents for a knee surgery.  Patient was found to have an abnormal preop ECG and I was asked to see patient for perioperative risk assessment.  Patient says that she is been doing relatively well from a cardiovascular standpoint.  She had some chest discomfort several years ago and had a stress test at [facility] and is reportedly normal.  Patient has diabetes hypertension and is a current smoker.  She is been diagnosed with sleep apnea many years ago and currently does not use a CPAP.  Patient denies any types of chest discomforts.  Patient says she is remained very active up until the last year because of her knee.  She has not had any chest discomfort and ultimately she was diagnosed with rheumatoid arthritis.  She had about a month or so of steroids since the diagnosis and nothing consistent.  She has no limitations of her activities.      CXR 19  IMPRESSION:  Negative chest x-ray. Advanced arthritic change at the right  knee.      Past Medical History:  Past Medical History:   Diagnosis Date   • Arthritis    • Diabetes mellitus (CMS/[facility])    • GERD (gastroesophageal reflux disease)    • Hyperlipidemia    • Hypertension    • RA (rheumatoid arthritis) (CMS/[facility])    • Sleep apnea     NO CPAP       Past Surgical History:   Procedure Laterality  Date   • COLON RESECTION     • COLONOSCOPY     • HYSTERECTOMY     • LAPAROSCOPIC CHOLECYSTECTOMY     • LUNG LOBECTOMY      RT LL.       Home Medications:   Medications Prior to Admission   Medication Sig Dispense Refill Last Dose   • BENAZEPRIL HCL PO Take 1 tablet by mouth Daily. Unsure of dosage   7/15/2019 at 1000   • cetirizine (zyrTEC) 10 MG tablet Take 10 mg by mouth Daily.   7/15/2019 at 1000   • Chlorhexidine Gluconate Cloth 2 % pads Apply 1 application topically Take As Directed.   2019 at 0900   • Cholecalciferol (VITAMIN D3) 5000 units capsule capsule Take 5,000 Units by mouth Daily.   2019   • esomeprazole (nexIUM) 40 MG capsule Take 40 mg by mouth Every Morning Before Breakfast.   2019 at 0730   • fluticasone (FLONASE) 50 MCG/ACT nasal spray 2 sprays into the nostril(s) as directed by provider Daily.   7/15/2019 at 1000   • HYDROcodone-acetaminophen (NORCO)  MG per tablet Take 1 tablet by mouth Every 6 (Six) Hours As Needed for Moderate Pain .   7/15/2019 at 1900   • Multiple Vitamins-Minerals (MULTIVITAMIN ADULT PO) Take 1 tablet by mouth Daily.   2019   • mupirocin (BACTROBAN) 2 % nasal ointment 1 application into the nostril(s) as directed by provider Take As Directed.   2019 at 0745   • pravastatin (PRAVACHOL) 10 MG tablet Take 10 mg by mouth Daily.   7/15/2019 at 1000   • SITagliptin-metFORMIN HCl (JANUMET PO) Take 1 tablet by mouth Daily.   7/15/2019 at 1000       Allergies:  Allergies   Allergen Reactions   • Clindamycin/Lincomycin Other (See Comments)     RECTAL BLEEDING       Past Social History:  Social History     Socioeconomic History   • Marital status: Single     Spouse name: Not on file   • Number of children: Not on file   • Years of education: Not on file   • Highest education level: Not on file   Tobacco Use   • Smoking status: Former Smoker     Years: 30.00     Types: Cigarettes     Last attempt to quit: 2019     Years since quittin.0   • Tobacco  comment: QUIT 8 DAYS AGO   Substance and Sexual Activity   • Alcohol use: No     Frequency: Never   • Drug use: No       Past Family History: History reviewed. No pertinent family history.   Family History   Problem Relation Age of Onset   • Malig Hyperthermia Neg Hx        Review of Systems   All other systems reviewed and are negative.          Objective:     Objective:  Temp:  [98.3 °F (36.8 °C)] 98.3 °F (36.8 °C)  Heart Rate:  [92] 92  Resp:  [20] 20  BP: (112)/(71) 112/71  No intake or output data in the 24 hours ending 07/16/19 1246  Body mass index is 30.29 kg/m².      07/16/19  1215   Weight: 93 kg (205 lb 1.6 oz)           Physical Exam:   Physical Exam   Constitutional: She is oriented to person, place, and time. She appears well-developed.   HENT:   Head: Normocephalic.   Eyes: Conjunctivae are normal.   Neck: Normal range of motion.   Cardiovascular: Normal rate, regular rhythm and normal heart sounds.   Pulmonary/Chest: Breath sounds normal.   Abdominal: Soft. Bowel sounds are normal.   Musculoskeletal: Normal range of motion. She exhibits no edema.   Neurological: She is alert and oriented to person, place, and time.   Skin: Skin is warm and dry.   Psychiatric: She has a normal mood and affect. Her behavior is normal.   Vitals reviewed.       Labs:   Lab Review:                                                 PREVIOUS EKG 7/9/19          EKG             Assessment:       * No active hospital problems. *        Plan:     1.  Abnormal ECG.  EKG noted above no prior ECG for comparison.  EKG is consistent with possible anterior wall myocardial infarction as noted in the official reading.  However she has had no signs or symptoms of such and she has had a stress test in the last several years.  She has remained very active up until year ago and again her knee is what has limited her from doing exercise.  In light of that clinically she does not have any signs or symptoms either currently or previously of  definitive cardiac symptoms.  She does have multiple cardiac risk factors.  At this point I think she is a low risk for surgery would proceed as clinically indicated we will address any issues that should arise.    Thank you for allowing me to participate in the care of Ken Galo. Feel free to contact me directly with any further questions or concerns.    Ave Graham RN  [facility]  07/16/19  12:46 PM

## 2019-07-16 NOTE — ANESTHESIA PROCEDURE NOTES
Airway  Urgency: elective    Date/Time: 7/16/2019 2:40 PM  Airway not difficult    General Information and Staff    Patient location during procedure: OR  Anesthesiologist: Bethel Saab MD  CRNA: Gracy Daniel CRNA    Indications and Patient Condition  Indications for airway management: airway protection    Preoxygenated: yes  MILS not maintained throughout  Mask difficulty assessment: 0 - not attempted    Final Airway Details  Final airway type: supraglottic airway      Successful airway: unique  Size 4    Number of attempts at approach: 1    Additional Comments  LMA inserted with ease, assist to SV

## 2019-07-16 NOTE — BRIEF OP NOTE
TOTAL KNEE ARTHROPLASTY  Progress Note    Ken Galo  7/16/2019    Pre-op Diagnosis:   Right knee OA / RA       Post-Op Diagnosis Codes:   Same    Procedure/CPT® Codes:      Procedure(s):  TOTAL KNEE ARTHROPLASTY    Surgeon(s):  Harshal Stokes MD    Anesthesia: General with Block    Staff:   Circulator: Savi Cat RN; Macario Combs RN  Scrub Person: Eleazar Walter  Vendor Representative: Nicolas More  Assistant: Derrick Haines DO    Estimated Blood Loss: 100ml    Urine Voided: * No values recorded between 7/16/2019  2:31 PM and 7/16/2019  4:06 PM *    Specimens:                None          Drains:      Findings: OA / RA    Complications: None      Harshal Stokes MD     Date: 7/16/2019  Time: 4:08 PM

## 2019-07-16 NOTE — ANESTHESIA PROCEDURE NOTES
Peripheral Block    Pre-sedation assessment completed: 7/16/2019 1:49 PM    Patient reassessed immediately prior to procedure    Patient location during procedure: pre-op  Start time: 7/16/2019 1:50 PM  Stop time: 7/16/2019 1:55 PM  Reason for block: at surgeon's request and post-op pain management  Performed by  Anesthesiologist: Bethel Saab MD  Preanesthetic Checklist  Completed: patient identified, site marked, surgical consent, pre-op evaluation, timeout performed, IV checked, risks and benefits discussed and monitors and equipment checked  Prep:  Pt Position: supine  Sterile barriers:cap, gloves, mask and sterile barriers  Prep: ChloraPrep  Patient monitoring: blood pressure monitoring, continuous pulse oximetry and EKG  Procedure  Sedation:yes  Performed under: local infiltration  Guidance:ultrasound guided  Images:still images obtained    Laterality:right  Block Type:adductor canal block  Injection Technique:single-shot  Needle Type:echogenic and short-bevel  Needle Gauge:21 G  Resistance on Injection: none    Medications Used: ropivacaine (NAROPIN) 0.5 % injection, 20 mL  Med admintered at 7/16/2019 1:55 PM      Post Assessment  Injection Assessment: negative aspiration for heme, no paresthesia on injection and incremental injection  Patient Tolerance:comfortable throughout block  Complications:no  Additional Notes  Ultrasound guidance used for location of target, insertion of needle, injection of medication, and placement confirmation.

## 2019-07-16 NOTE — ANESTHESIA PREPROCEDURE EVALUATION
Anesthesia Evaluation     Patient summary reviewed   no history of anesthetic complications:  NPO Solid Status: > 8 hours  NPO Liquid Status: > 2 hours           Airway   Mallampati: II  TM distance: >3 FB  Neck ROM: full  Dental          Pulmonary     breath sounds clear to auscultation  (+) a smoker Current Abstained day of surgery, sleep apnea (Needs CPAP but doesn't tolerate well),   (-) shortness of breath  Cardiovascular   Exercise tolerance: good (4-7 METS)    ECG reviewed  Rhythm: regular  Rate: normal    (+) hypertension well controlled, hyperlipidemia,   (-) angina, SHEPPARD    ROS comment: - ABNORMAL ECG -  Sinus rhythm  Consider anteroseptal infarct  NO PRIOR TRACING AVAILABLE FOR COMPARISON    Cardiology consult obtained. Low risk per cardiologist    Neuro/Psych  GI/Hepatic/Renal/Endo    (+) obesity,  GERD,  diabetes mellitus well controlled,     Musculoskeletal     Abdominal    Substance History      OB/GYN          Other   (+) arthritis                   Anesthesia Plan    ASA 3     general with block   (Nerve block(s) requested by surgeon for post-op pain control. Risks, benefits, and alternatives discussed with patient or patient representative who agrees to proceed with plan.   )  intravenous induction   Anesthetic plan, all risks, benefits, and alternatives have been provided, discussed and informed consent has been obtained with: patient.

## 2019-07-16 NOTE — ANESTHESIA POSTPROCEDURE EVALUATION
"Patient: Ken Galo    Procedure Summary     Date:  07/16/19 Room / Location:  [facility] OR  / [facility] MAIN OR    Anesthesia Start:  1434 Anesthesia Stop:  1622    Procedure:  TOTAL KNEE ARTHROPLASTY (Right Knee) Diagnosis:      Surgeon:  Harshal Stokes MD Provider:  Bethel Saab MD    Anesthesia Type:  general ASA Status:  3          Anesthesia Type: general  Last vitals  BP   165/84 (07/16/19 1734)   Temp   36.8 °C (98.2 °F) (07/16/19 1618)   Pulse   109 (07/16/19 1734)   Resp   20 (07/16/19 1734)     SpO2   100 % (07/16/19 1734)     Post Anesthesia Care and Evaluation    Patient location during evaluation: bedside  Patient participation: complete - patient participated  Level of consciousness: awake and alert  Pain management: adequate  Airway patency: patent  Anesthetic complications: No anesthetic complications    Cardiovascular status: acceptable  Respiratory status: acceptable  Hydration status: acceptable    Comments: /80   Pulse 112   Temp 36.8 °C (98.2 °F) (Oral)   Resp 20   Ht 175.3 cm (69\")   Wt 93 kg (205 lb 1.6 oz)   SpO2 94%   BMI 30.29 kg/m²       "

## 2019-07-16 NOTE — H&P
Orthopaedic H&P      Patient: Ken Galo    Date of Admission: 7/16/2019 11:51 AM    YOB: 1960    Medical Record Number: [id number]    Attending Physician:  Harshal Stokes MD    Chief Complaints: Right knee OA / RA    History of Present Illness: 58 y.o. female admitted to [facility] with Right Knee OA / RA. I was consulted for further evaluation and treatment. Onset of symptoms was gradual starting several years ago.  Symptoms are associated with right knee pain.  Symptoms are aggravated by motion.   Symptoms improve with rest.     Allergies:   Allergies   Allergen Reactions   • Clindamycin/Lincomycin Other (See Comments)     RECTAL BLEEDING       Medications:   Home Medications:  Medications Prior to Admission   Medication Sig Dispense Refill Last Dose   • BENAZEPRIL HCL PO Take 1 tablet by mouth Daily. Unsure of dosage   7/15/2019 at 1000   • cetirizine (zyrTEC) 10 MG tablet Take 10 mg by mouth Daily.   7/15/2019 at 1000   • Chlorhexidine Gluconate Cloth 2 % pads Apply 1 application topically Take As Directed.   7/16/2019 at 0900   • Cholecalciferol (VITAMIN D3) 5000 units capsule capsule Take 5,000 Units by mouth Daily.   7/14/2019   • esomeprazole (nexIUM) 40 MG capsule Take 40 mg by mouth Every Morning Before Breakfast.   7/16/2019 at 0730   • fluticasone (FLONASE) 50 MCG/ACT nasal spray 2 sprays into the nostril(s) as directed by provider Daily.   7/15/2019 at 1000   • HYDROcodone-acetaminophen (NORCO)  MG per tablet Take 1 tablet by mouth Every 6 (Six) Hours As Needed for Moderate Pain .   7/15/2019 at 1900   • Multiple Vitamins-Minerals (MULTIVITAMIN ADULT PO) Take 1 tablet by mouth Daily.   7/14/2019   • mupirocin (BACTROBAN) 2 % nasal ointment 1 application into the nostril(s) as directed by provider Take As Directed.   7/16/2019 at 0745   • pravastatin (PRAVACHOL) 10 MG tablet Take 10 mg by mouth Daily.   7/15/2019 at 1000   • SITagliptin-metFORMIN HCl (NOEMYUMET  PO) Take 1 tablet by mouth Daily.   7/15/2019 at 1000       Current Medications:  Scheduled Meds:  ceFAZolin 2 g Intravenous Once   mupirocin  Each Nare BID     Continuous Infusions:  lactated ringers 9 mL/hr Last Rate: 9 mL/hr (19 1241)     PRN Meds:.fentanyl  •  lidocaine PF 1%  •  midazolam **OR** midazolam  •  sodium chloride    Past Medical History:   Diagnosis Date   • Arthritis    • Diabetes mellitus (CMS/HCC)    • GERD (gastroesophageal reflux disease)    • Hyperlipidemia    • Hypertension    • RA (rheumatoid arthritis) (CMS/HCC)    • Sleep apnea     NO CPAP     Past Surgical History:   Procedure Laterality Date   • COLON RESECTION     • COLONOSCOPY     • HYSTERECTOMY     • LAPAROSCOPIC CHOLECYSTECTOMY     • LUNG LOBECTOMY      RT LL.     Social History     Occupational History   • Not on file   Tobacco Use   • Smoking status: Former Smoker     Years: 30.00     Types: Cigarettes     Last attempt to quit: 2019     Years since quittin.0   • Tobacco comment: QUIT 8 DAYS AGO   Substance and Sexual Activity   • Alcohol use: No     Frequency: Never   • Drug use: No   • Sexual activity: Not on file    Social History     Social History Narrative   • Not on file     Family History   Problem Relation Age of Onset   • Malig Hyperthermia Neg Hx          Review of Systems:   No other pertinent positives or negatives other than what is mentioned in the HPI and below.  Constitutional: Negative for fatigue, fever, or weight loss  HEENT: No active headache.  Pulmonary: Patient denies SOA.  Cardiovascular: Patient denies any chest pain.  Gastrointestinal:  Patient denies active vomiting or diarrhea.  Musculoskeletal: Positive for Right knee pain.  Neurological: Patient denies active dizziness or loss of consciousness.  Skin: Patient denies any active bleeding.    Vital signs in last 24 hours:  Temp:  [98.3 °F (36.8 °C)] 98.3 °F (36.8 °C)  Heart Rate:  [81-92] 81  Resp:  [16-20] 16  BP: (112-140)/(71-77)  "140/76  Vitals:    07/16/19 1215 07/16/19 1335 07/16/19 1352 07/16/19 1354   BP: 112/71  124/77 140/76   BP Location: Right arm   Right arm   Patient Position: Lying   Lying   Pulse: 92 84 85 81   Resp: 20 16 16   Temp: 98.3 °F (36.8 °C)      TempSrc: Oral      SpO2: 98% 99%  Comment: Post sedation 100% 99%   Weight: 93 kg (205 lb 1.6 oz)      Height: 175.3 cm (69\")             Physical Exam: 58 y.o. female         General Appearance:  Alert, cooperative, in no acute distress    HEENT:    Atraumatic, Pupils are equal   Neck:   Cervical spine midline, no appreciable JVD   Lungs:     Breathing non-labored and chest rise symmetric    Heart:   Abdomen:     Rectal:  Musculoskeletal:     Extremities:   Pulses  Neurovascular:   Skin:         Pulse regular    Soft, Non-tender or distended    Deferred    Right knee skin intact, nvi, perfused, soft.    No clubbing, cyanosis, or edema    Intact    Cranial nerves 2 - 12 grossly intact, sensation intact    No skin lesions   Assessment:  Patient Active Problem List   Diagnosis   • Status post total knee replacement, right   Right Knee OA / RA.    Plan:  The patient voiced understanding of the risks, benefits, and alternative forms of treatment that were discussed and the patient consents to proceed with right total knee arthroplasty as scheduled.  No changes.  All questions answered.     Date: 7/16/2019  Harshal Stokes MD      "

## 2019-07-17 VITALS
HEIGHT: 69 IN | SYSTOLIC BLOOD PRESSURE: 172 MMHG | OXYGEN SATURATION: 95 % | BODY MASS INDEX: 30.38 KG/M2 | RESPIRATION RATE: 16 BRPM | DIASTOLIC BLOOD PRESSURE: 102 MMHG | WEIGHT: 205.1 LBS | TEMPERATURE: 98.3 F | HEART RATE: 92 BPM

## 2019-07-17 LAB
ANION GAP SERPL CALCULATED.3IONS-SCNC: 13.4 MMOL/L (ref 5–15)
BUN BLD-MCNC: 13 MG/DL (ref 6–20)
BUN/CREAT SERPL: 16.3 (ref 7–25)
CALCIUM SPEC-SCNC: 9.5 MG/DL (ref 8.6–10.5)
CHLORIDE SERPL-SCNC: 99 MMOL/L (ref 98–107)
CO2 SERPL-SCNC: 25.6 MMOL/L (ref 22–29)
CREAT BLD-MCNC: 0.8 MG/DL (ref 0.57–1)
DEPRECATED RDW RBC AUTO: 53.6 FL (ref 37–54)
ERYTHROCYTE [DISTWIDTH] IN BLOOD BY AUTOMATED COUNT: 17.7 % (ref 12.3–15.4)
GFR SERPL CREATININE-BSD FRML MDRD: 90 ML/MIN/1.73
GLUCOSE BLD-MCNC: 136 MG/DL (ref 65–99)
GLUCOSE BLDC GLUCOMTR-MCNC: 103 MG/DL (ref 70–130)
GLUCOSE BLDC GLUCOMTR-MCNC: 153 MG/DL (ref 70–130)
HCT VFR BLD AUTO: 38 % (ref 34–46.6)
HGB BLD-MCNC: 12.1 G/DL (ref 12–15.9)
MCH RBC QN AUTO: 26.9 PG (ref 26.6–33)
MCHC RBC AUTO-ENTMCNC: 31.8 G/DL (ref 31.5–35.7)
MCV RBC AUTO: 84.4 FL (ref 79–97)
PLATELET # BLD AUTO: 293 10*3/MM3 (ref 140–450)
PMV BLD AUTO: 11 FL (ref 6–12)
POTASSIUM BLD-SCNC: 4 MMOL/L (ref 3.5–5.2)
RBC # BLD AUTO: 4.5 10*6/MM3 (ref 3.77–5.28)
SODIUM BLD-SCNC: 138 MMOL/L (ref 136–145)
WBC NRBC COR # BLD: 14.33 10*3/MM3 (ref 3.4–10.8)

## 2019-07-17 PROCEDURE — 85027 COMPLETE CBC AUTOMATED: CPT | Performed by: ORTHOPAEDIC SURGERY

## 2019-07-17 PROCEDURE — 97161 PT EVAL LOW COMPLEX 20 MIN: CPT

## 2019-07-17 PROCEDURE — 97110 THERAPEUTIC EXERCISES: CPT

## 2019-07-17 PROCEDURE — 63710000001 INSULIN LISPRO (HUMAN) PER 5 UNITS: Performed by: ORTHOPAEDIC SURGERY

## 2019-07-17 PROCEDURE — 25010000003 CEFAZOLIN IN DEXTROSE 2-4 GM/100ML-% SOLUTION: Performed by: ORTHOPAEDIC SURGERY

## 2019-07-17 PROCEDURE — 80048 BASIC METABOLIC PNL TOTAL CA: CPT | Performed by: ORTHOPAEDIC SURGERY

## 2019-07-17 PROCEDURE — 82962 GLUCOSE BLOOD TEST: CPT

## 2019-07-17 RX ORDER — ASPIRIN 325 MG
325 TABLET, DELAYED RELEASE (ENTERIC COATED) ORAL 2 TIMES DAILY
Qty: 56 TABLET | Refills: 0
Start: 2019-07-17 | End: 2020-08-10 | Stop reason: ALTCHOICE

## 2019-07-17 RX ADMIN — PANTOPRAZOLE SODIUM 40 MG: 40 TABLET, DELAYED RELEASE ORAL at 07:00

## 2019-07-17 RX ADMIN — OXYCODONE HYDROCHLORIDE AND ACETAMINOPHEN 2 TABLET: 7.5; 325 TABLET ORAL at 06:36

## 2019-07-17 RX ADMIN — FLUTICASONE PROPIONATE 2 SPRAY: 50 SPRAY, METERED NASAL at 09:24

## 2019-07-17 RX ADMIN — INSULIN LISPRO 2 UNITS: 100 INJECTION, SOLUTION INTRAVENOUS; SUBCUTANEOUS at 09:24

## 2019-07-17 RX ADMIN — APIXABAN 2.5 MG: 2.5 TABLET, FILM COATED ORAL at 09:24

## 2019-07-17 RX ADMIN — PRAVASTATIN SODIUM 10 MG: 10 TABLET ORAL at 09:24

## 2019-07-17 RX ADMIN — VITAMIN D, TAB 1000IU (100/BT) 5000 UNITS: 25 TAB at 09:24

## 2019-07-17 RX ADMIN — OXYCODONE HYDROCHLORIDE AND ACETAMINOPHEN 2 TABLET: 7.5; 325 TABLET ORAL at 11:04

## 2019-07-17 RX ADMIN — CEFAZOLIN SODIUM 2 G: 2 INJECTION, SOLUTION INTRAVENOUS at 06:36

## 2019-07-17 RX ADMIN — CETIRIZINE HYDROCHLORIDE 10 MG: 10 TABLET, FILM COATED ORAL at 09:24

## 2019-07-17 RX ADMIN — OXYCODONE HYDROCHLORIDE AND ACETAMINOPHEN 2 TABLET: 7.5; 325 TABLET ORAL at 02:36

## 2019-07-17 RX ADMIN — SENNOSIDES,DOCUSATE SODIUM 1 TABLET: 50; 8.6 TABLET, FILM COATED ORAL at 09:24

## 2019-07-17 RX ADMIN — MULTIPLE VITAMINS W/ MINERALS TAB 1 TABLET: TAB at 09:24

## 2019-07-17 NOTE — PLAN OF CARE
Problem: Patient Care Overview  Goal: Plan of Care Review  Outcome: Ongoing (interventions implemented as appropriate)   07/17/19 0319   Coping/Psychosocial   Plan of Care Reviewed With patient   Plan of Care Review   Progress improving   OTHER   Outcome Summary Pt is op post op day 1 of a rt total knee. Dressing is clean dry and intact. Pt continues with PO pain meds that provide relied. No nausea or vomiting noted. Pt has ambulated to the bathroom with an assist x1. Voiding function intact.  Pt educated on the importance of monitoring blood pressures related to comorbidity of HTN. Pt voiced understanding. Pt is resting at this time, will continue to monitor.      Goal: Individualization and Mutuality  Outcome: Ongoing (interventions implemented as appropriate)    Goal: Discharge Needs Assessment  Outcome: Ongoing (interventions implemented as appropriate)    Goal: Interprofessional Rounds/Family Conf  Outcome: Ongoing (interventions implemented as appropriate)      Problem: Pain, Chronic (Adult)  Goal: Identify Related Risk Factors and Signs and Symptoms  Outcome: Ongoing (interventions implemented as appropriate)    Goal: Acceptable Pain/Comfort Level and Functional Ability  Outcome: Ongoing (interventions implemented as appropriate)      Problem: Fall Risk (Adult)  Goal: Absence of Fall  Outcome: Ongoing (interventions implemented as appropriate)      Problem: Knee Arthroplasty (Total, Partial) (Adult)  Goal: Signs and Symptoms of Listed Potential Problems Will be Absent, Minimized or Managed (Knee Arthroplasty)  Outcome: Ongoing (interventions implemented as appropriate)    Goal: Anesthesia/Sedation Recovery  Outcome: Ongoing (interventions implemented as appropriate)

## 2019-07-17 NOTE — PLAN OF CARE
Problem: Patient Care Overview  Goal: Plan of Care Review   07/17/19 0915   OTHER   Outcome Summary Pt POD1 R TKA. Previously independent. Has all necessary equipment at home. Upon eval, decreased R knee ROm/strength as expected. CGA-SBA for all mobility with Rwx. Educated on stairs, ambulation, car transfers, post op exercises. Answered all questions. No further acute care PT needs at this time. Recommend home with [facility] when medically able. pt and RN in agreement.

## 2019-07-17 NOTE — THERAPY DISCHARGE NOTE
Acute Care - Physical Therapy Initial Eval/Discharge  [facility]     Patient Name: Ken Galo  : 1960  MRN: [id number]  Today's Date: 2019   Onset of Illness/Injury or Date of Surgery: 19     Referring Physician: Divya      Admit Date: 2019    Visit Dx:    ICD-10-CM ICD-9-CM   1. Decreased mobility R26.89 781.99     Patient Active Problem List   Diagnosis   • Status post total knee replacement, right     Past Medical History:   Diagnosis Date   • Arthritis    • Diabetes mellitus (CMS/HCC)    • GERD (gastroesophageal reflux disease)    • Hyperlipidemia    • Hypertension    • RA (rheumatoid arthritis) (CMS/HCC)    • Sleep apnea     NO CPAP     Past Surgical History:   Procedure Laterality Date   • COLON RESECTION     • COLONOSCOPY     • HYSTERECTOMY     • LAPAROSCOPIC CHOLECYSTECTOMY     • LUNG LOBECTOMY      RT LL.   • TOTAL KNEE ARTHROPLASTY Right 2019    Procedure: TOTAL KNEE ARTHROPLASTY;  Surgeon: Harshal Stokes MD;  Location: [facility] OR;  Service: Orthopedics          PT ASSESSMENT (last 12 hours)      Physical Therapy Evaluation     Row Name 19 0910          PT Evaluation Time/Intention    Subjective Information  complains of;pain  -MA     Document Type  evaluation;therapy note (daily note)  -MA     Mode of Treatment  physical therapy;individual therapy  -MA     Patient Effort  good  -MA     Symptoms Noted During/After Treatment  none  -MA     Row Name 19 0910          General Information    Patient Profile Reviewed?  yes  -MA     Onset of Illness/Injury or Date of Surgery  19  -MA     Referring Physician  Divya HUERTAS     Patient Observations  alert;cooperative;agree to therapy  -MA     Patient/Family Observations  supine in bed, no acute distress  -MA     Prior Level of Function  independent:  -MA     Equipment Currently Used at Home  none has Rwx and commode  -MA     Pertinent History of Current Functional Problem  POD1 RTKA  -MA     Existing  Precautions/Restrictions  fall  -MA     Barriers to Rehab  none identified  -MA     Row Name 07/17/19 0910          Relationship/Environment    Lives With  significant other  -MA     Row Name 07/17/19 0910          Living Environment    Home Accessibility  stairs to enter home  -MA     Row Name 07/17/19 0910          Home Main Entrance    Number of Stairs, Main Entrance  two  -MA     Stair Railings, Main Entrance  railings safe and in good condition  -MA     Row Name 07/17/19 0910          Cognitive Assessment/Interventions    Additional Documentation  Cognitive Assessment/Intervention (Group)  -MA     Row Name 07/17/19 0910          Cognitive Assessment/Intervention- PT/OT    Affect/Mental Status (Cognitive)  WNL  -MA     Orientation Status (Cognition)  oriented x 4  -MA     Follows Commands (Cognition)  WNL  -MA     Personal Safety Interventions  fall prevention program maintained;gait belt;muscle strengthening facilitated;nonskid shoes/slippers when out of bed;supervised activity  -MA     Row Name 07/17/19 0910          Bed Mobility Assessment/Treatment    Bed Mobility Assessment/Treatment  supine-sit  -MA     Supine-Sit [place] (Bed Mobility)  supervision  -MA     Bed Mobility, Safety Issues  decreased use of legs for bridging/pushing  -MA     Assistive Device (Bed Mobility)  bed rails;head of bed elevated  -MA     Row Name 07/17/19 0910          Transfer Assessment/Treatment    Transfer Assessment/Treatment  sit-stand transfer;stand-sit transfer  -MA     Sit-Stand [place] (Transfers)  supervision  -MA     Stand-Sit [place] (Transfers)  supervision  -[facility] Name 07/17/19 0910          Sit-Stand Transfer    Assistive Device (Sit-Stand Transfers)  walker, front-wheeled  -[facility] Name 07/17/19 0910          Stand-Sit Transfer    Assistive Device (Stand-Sit Transfers)  walker, front-wheeled  -[facility] Name 07/17/19 0910          Gait/Stairs Assessment/Training    [place] Level (Gait)   supervision  -MA     Assistive Device (Gait)  walker, front-wheeled  -MA     Distance in Feet (Gait)  200  -MA     Pattern (Gait)  step-to  -MA     Deviations/Abnormal Patterns (Gait)  right sided deviations;antalgic;base of support, wide;cleo decreased;stride length decreased  -MA     Right Sided Gait Deviations  heel strike decreased;weight shift ability decreased  -MA     Negotiation (Stairs)  stairs independence  -MA     [place] Level (Stairs)  contact guard  -MA     Handrail Location (Stairs)  right side (ascending)  -MA     Number of Steps (Stairs)  4  -MA     Ascending Technique (Stairs)  step-to-step  -MA     Descending Technique (Stairs)  step-to-step  -MA     Comment (Gait/Stairs)  cues for sequencing on stairs and with walker   -MA     Row Name 07/17/19 0910          General ROM    GENERAL ROM COMMENTS  R LE grossly -10-40deg  -MA     Row Name 07/17/19 0910          MMT (Manual Muscle Testing)    General MMT Comments  R LE post op weakness   -MA     Row Name 07/17/19 0910          Motor Assessment/Intervention    Additional Documentation  Therapeutic Exercise Interventions (Group)  -MA     Row Name 07/17/19 0910          Therapeutic Exercise    Comment (Therapeutic Exercise)  R TKA post op protocol x10   -MA     Row Name 07/17/19 0910          Pain Assessment    Additional Documentation  Pain Scale: Word Pre/Post-Treatment (Group)  -MA     Row Name 07/17/19 0910          Pain Scale: Numbers Pre/Post-Treatment    Pain Location - Side  Right  -MA     Pain Location  mouth (non-dental)  -MA     Pain Intervention(s)  Repositioned;Cold applied;Ambulation/increased activity  -MA     Row Name 07/17/19 0910          Pain Scale: Word Pre/Post-Treatment    Pain: Word Scale, Pretreatment  2 - mild pain  -MA     Pain: Word Scale, Post-Treatment  2 - mild pain  -MA     Row Name             Wound 07/16/19 1554 Right leg incision    Wound - Properties Group Date first assessed: 07/16/19  -TL Time first assessed:  1554  -TL Side: Right  -TL Location: leg  -TL Type: incision  -TL    Row Name 07/17/19 0910          Physical Therapy Clinical Impression    Patient/Family Goals Statement (PT Clinical Impression)  return home   -MA     Criteria for Skilled Interventions Met (PT Clinical Impression)  yes;treatment indicated  -MA     Pathology/Pathophysiology Noted (Describe Specifically for Each System)  musculoskeletal  -MA     Impairments Found (describe specific impairments)  gait, locomotion, and balance;muscle performance;ROM  -MA     Rehab Potential (PT Clinical Summary)  good, to achieve stated therapy goals  -MA     Row Name 07/17/19 0910          Vital Signs    O2 Delivery Pre Treatment  room air  -MA     Row Name 07/17/19 0910          Physical Therapy Goals    Bed Mobility Goal Selection (PT)  bed mobility, PT goal 1  -MA     Transfer Goal Selection (PT)  transfer, PT goal 1  -MA     Gait Training Goal Selection (PT)  gait training, PT goal 1  -MA     Stairs Goal Selection (PT)  stairs, PT goal 1  -MA     Additional Documentation  Stairs Goal Selection (PT) (Row)  -MA     Row Name 07/17/19 0910          Bed Mobility Goal 1 (PT)    Activity/Assistive Device (Bed Mobility Goal 1, PT)  bed mobility activities, all  -MA     [place] Level/Cues Needed (Bed Mobility Goal 1, PT)  independent  -MA     Time Frame (Bed Mobility Goal 1, PT)  2 days  -MA     Row Name 07/17/19 0910          Transfer Goal 1 (PT)    Activity/Assistive Device (Transfer Goal 1, PT)  sit-to-stand/stand-to-sit;bed-to-chair/chair-to-bed;walker, rolling  -MA     [place] Level/Cues Needed (Transfer Goal 1, PT)  conditional independence  -MA     Time Frame (Transfer Goal 1, PT)  2 days  -MA     Row Name 07/17/19 0910          Gait Training Goal 1 (PT)    Activity/Assistive Device (Gait Training Goal 1, PT)  gait (walking locomotion);walker, rolling  -MA     [place] Level (Gait Training Goal 1, PT)  conditional independence  -MA     Distance (Gait  Goal 1, PT)  200  -MA     Time Frame (Gait Training Goal 1, PT)  2 days  -MA     Row Name 07/17/19 0910          Stairs Goal 1 (PT)    Activity/Assistive Device (Stairs Goal 1, PT)  ascending stairs;descending stairs  -MA     [place] Level/Cues Needed (Stairs Goal 1, PT)  supervision required  -MA     Number of Stairs (Stairs Goal 1, PT)  2  -MA     Time Frame (Stairs Goal 1, PT)  2 days  -MA     Row Name 07/17/19 0910          Patient Education Goal (PT)    Activity (Patient Education Goal, PT)  Independent with HEP, R knee ROM 0-90deg  -MA     Row Name 07/17/19 0910          Positioning and Restraints    Pre-Treatment Position  in bed  -MA     Post Treatment Position  chair  -MA     In Chair  notified nsg;reclined;call light within reach;encouraged to call for assist no alarm on when entering room   -MA       User Key  (r) = Recorded By, (t) = Taken By, (c) = Cosigned By    Initials Name Provider Type    Savi Botello RN Registered Nurse    Ying Olivas, PT Physical Therapist          Physical Therapy Education     Title: PT OT SLP Therapies (Done)     Topic: Physical Therapy (Done)     Point: Mobility training (Done)     Learning Progress Summary           Patient Acceptance, E, VU by MA at 7/17/2019  9:15 AM                   Point: Home exercise program (Done)     Learning Progress Summary           Patient Acceptance, E, VU by MA at 7/17/2019  9:15 AM                   Point: Body mechanics (Done)     Learning Progress Summary           Patient Acceptance, E, VU by MA at 7/17/2019  9:15 AM                   Point: Precautions (Done)     Learning Progress Summary           Patient Acceptance, E, VU by MA at 7/17/2019  9:15 AM                               User Key     Initials Effective Dates Name Provider Type Discipline    MA 10/19/18 -  Ying German, PT Physical Therapist PT                PT Recommendation and Plan  Anticipated Discharge Disposition (PT): home with assist, home with home  health  Planned Therapy Interventions (PT Eval): balance training, bed mobility training, gait training, home exercise program, patient/family education, stair training, ROM (range of motion), strengthening, transfer training  Therapy Frequency (PT Clinical Impression): 2 times/day  Outcome Summary/Treatment Plan (PT)  Anticipated Discharge Disposition (PT): home with assist, home with home health  Outcome Summary: Pt POD1 R TKA. Previously independent. Has all necessary equipment at home. Upon eval, decreased R knee ROm/strength as expected. CGA-SBA for all mobility with Rwx. Educated on stairs, ambulation, car transfers, post op exercises. Answered all questions. No further acute care PT needs at this time. Recommend home with [facility]T when medically able. pt and RN in agreement.     Outcome Measures     Row Name 07/17/19 0900             How much help from another person do you currently need...    Turning from your back to your side while in flat bed without using bedrails?  4  -MA      Moving from lying on back to sitting on the side of a flat bed without bedrails?  4  -MA      Moving to and from a bed to a chair (including a wheelchair)?  3  -MA      Standing up from a chair using your arms (e.g., wheelchair, bedside chair)?  3  -MA      Climbing 3-5 steps with a railing?  3  -MA      To walk in hospital room?  3  -MA      AM-PAC 6 Clicks Score (PT)  20  -MA         Functional Assessment    Outcome Measure Options  AM-PAC 6 Clicks Basic Mobility (PT)  -MA        User Key  (r) = Recorded By, (t) = Taken By, (c) = Cosigned By    Initials Name Provider Type    Ying Olivas, PT Physical Therapist           Time Calculation:   PT Charges     Row Name 07/17/19 0917             Time Calculation    Start Time  0839  -MA      Stop Time  0906  -MA      Time Calculation (min)  27 min  -MA      PT Received On  07/17/19  -MA      PT - Next Appointment  07/17/19  -MA      PT Goal Re-Cert Due Date  07/19/19  -MA         Time  Calculation- PT    Total Timed Code Minutes- PT  23 minute(s)  -MA        User Key  (r) = Recorded By, (t) = Taken By, (c) = Cosigned By    Initials Name Provider Type    Ying Olivas, PT Physical Therapist        Therapy Charges for Today     Code Description Service Date Service Provider Modifiers Qty    61913574675 HC PT EVAL LOW COMPLEXITY 2 7/17/2019 Ying German, PT GP 1    59292153836 HC PT THER PROC EA 15 MIN 7/17/2019 Ying German, PT GP 2          PT G-Codes  Outcome Measure Options: AM-PAC 6 Clicks Basic Mobility (PT)  AM-PAC 6 Clicks Score (PT): 20    PT Discharge Summary  Anticipated Discharge Disposition (PT): home with assist, home with home health    Ying German, SHIKHA  7/17/2019

## 2019-07-17 NOTE — PAYOR COMM NOTE
"DISCHARGED        Daphnie Galo (58 y.o. Female)     Date of Birth Social Security Number Address Home Phone MRN    1960  [id number] [facility] [phone] [id number]    Catholic Marital Status          Non-Sabianism Single       Admission Date Admission Type Admitting Provider Attending Provider Department, Room/Bed    7/16/19 Elective Harshal Stokes MD  [address], P789/1    Discharge Date Discharge Disposition Discharge Destination        7/17/2019 Home-Health Care Sv              Attending Provider:  (none)   Allergies:  Clindamycin/lincomycin    Isolation:  None   Infection:  None   Code Status:  CPR    Ht:  175.3 cm (69\")   Wt:  93 kg (205 lb 1.6 oz)    Admission Cmt:  None   Principal Problem:  None                Active Insurance as of 7/16/2019     Primary Coverage     Payor Plan Insurance Group Employer/Plan Group    [employer]HEM BLUE CROSS ANTHEM BLUE CROSS BLUE SHIELD PPO [id number]     Payor Plan Address Payor Plan Phone Number Payor Plan Fax Number Effective Dates    [address] [phone]  1/1/2018 - None Entered    [facility] [id number]       Subscriber Name Subscriber Birth Date Member ID       DAPHNIE GALO 1960 [id number]                 Emergency Contacts      (Rel.) Home Phone Work Phone Mobile Phone    MARGARITA AREVALO (Spouse) -- -- [phone]               Discharge Summary      [place] PA-C at 7/17/2019  8:03 AM     Attestation signed by Harshal Stokes MD at 7/17/2019  8:14 AM    D/C home possibly 7/17 or 7/18                      Discharge Summary    Date of Admission: 7/16/2019 11:51 AM  Date of Discharge:  7/17/2019    Discharge Diagnosis:   Status post total knee replacement, right [Z96.651]    PMHX:   Past Medical History:   Diagnosis Date   • Arthritis    • Diabetes mellitus (CMS/HCC)    • GERD (gastroesophageal reflux disease)    • Hyperlipidemia    • Hypertension    • RA (rheumatoid " arthritis) (CMS/[facility])    • Sleep apnea     NO CPAP       Discharge Disposition      Procedures Performed  Procedure(s):  TOTAL KNEE ARTHROPLASTY       Indication for Admission  Patient is a 58 y.o. female with a long standing history of knee difficulty. The patient had failed all conservative measures pre-operatively and found to be a candidate for Total knee arthroplasty. Risks, benefits, outcomes of the procedure discussed and the patient wished to proceed. Obtained pre-operative medical clearance and found to be medically stable to undergo the above procedure. Patient tolerated the procedure well and was admitted after undergoing the above surgical procedure. They were admitted for post-operative pain control, medical management and physical therapy. The patient was started on antibiotics post-operatively per SCIP protocol and DVT prophylaxis started immediately. Patient was ambulatory on POD #0. Post-operative laboratory studies and vital signs remained stable. Patient was evaluated by Physical Therapy and found to be medically stable for discharge.    Consults:   Consults     No orders found for last 30 day(s).          Discharge Instructions:  Patient is weight bearing as tolerated on the operative leg.  Patient is to progress range of motion and ambulation as tolerated.  Use walker as needed for stability and gait.  May progress to cane as tolerated.  The dressing should be left on knee until post-operative day 7, and then removed. Then daily dry dressing changes with a non-adherent island dressing (I.e. Telfa pad). Dressing is waterproof. Patient may shower.  Use ace wrap as needed for swelling.  Patient will follow-up in the office as directed by the operative surgeon.  Call the office at [phone] for any questions or concerns.      Discharge Medications: Per the discharge medication reconciliation list. Patient will be discharged on Eliquis 2.5mg PO BID x14 days for DVT prophylaxis.     Discharge Diet:  Patient will continue with their regular home diet.    Activity at Discharge: Weight bearing as tolerated. Walk with the use of a walker and progress with physical therapy. Focus on aggressive range of motion.    Follow-up Appointments: Follow-up with Dr. Stokes in 2 weeks.     West Nix PA-C  07/17/19,  8:03 AM    Electronically signed by Harshal Stokes MD at 7/17/2019  8:14 AM

## 2019-07-17 NOTE — PROGRESS NOTES
Discharge Planning Assessment  [facility]     Patient Name: Ken Galo  MRN: [id number]  Today's Date: 7/17/2019    Admit Date: 7/16/2019    Discharge Needs Assessment     Row Name 07/17/19 1119       Living Environment    Lives With  spouse    Current Living Arrangements  home/apartment/condo    Primary Care Provided by  self    Provides Primary Care For  no one    Family Caregiver if Needed  spouse    Quality of Family Relationships  supportive       Resource/Environmental Concerns    Resource/Environmental Concerns  none    Transportation Concerns  car, none       Transition Planning    Patient/Family Anticipates Transition to  home with family    Patient/Family Anticipated Services at Transition  home health care    Transportation Anticipated  family or friend will provide       Discharge Needs Assessment    Readmission Within the Last 30 Days  no previous admission in last 30 days    Concerns to be Addressed  other (see comments) rehab    Equipment Currently Used at Home  none    Anticipated Changes Related to Illness  none    Outpatient/Agency/Support Group Needs  homecare agency    Offered/Gave Vendor List  no        Discharge Plan     Row Name 07/17/19 1120       Plan    Plan  Home with [facility]    Plan Comments  Met with patient.  Verified facesheet info.  Patient lives with spouse and plan is home today.  Would like a referral to [facility] for PT.  Spoke with Baylee with [facility].  Patient going home on [employer].  Gave patient 30 day free supply card and $10 copay card.  No other needs identied.    Final Discharge Disposition Code  06 - home with home health care    Final Note  Home with [facility]        Destination      No service coordination in this encounter.      Durable Medical Equipment      No service coordination in this encounter.      Dialysis/Infusion      No service coordination in this encounter.      Home Medical Care      Service Provider Request Status Selected Services Address Phone Number Fax  Number    [facility] Pending - Request Sent N/A [address] [place] [id number] [phone]      Therapy      No service coordination in this encounter.      Community Resources      No service coordination in this encounter.        Expected Discharge Date and Time     Expected Discharge Date Expected Discharge Time    Jul 17, 2019         Demographic Summary     Row Name 07/17/19 1118       General Information    Admission Type  inpatient    Arrived From  operating room    Referral Source  admission list    Reason for Consult  discharge planning    Preferred Language  English     Used During This Interaction  no        Functional Status     Row Name 07/17/19 1119       Functional Status    Usual Activity Tolerance  good    Current Activity Tolerance  good       Functional Status, IADL    Medications  independent    Meal Preparation  independent    Housekeeping  independent    Laundry  independent       Mental Status Summary    Recent Changes in Mental Status/Cognitive Functioning  no changes        Psychosocial    No documentation.       Abuse/Neglect    No documentation.       Legal    No documentation.       Substance Abuse    No documentation.       Patient Forms    No documentation.           Nelsy Mary RN

## 2019-07-17 NOTE — PROGRESS NOTES
Case Management Discharge Note    Final Note: Home with [facility]    Destination      No service has been selected for the patient.      Durable Medical Equipment      No service has been selected for the patient.      Dialysis/Infusion      No service has been selected for the patient.      Home Medical Care      No service has been selected for the patient.      Therapy      No service has been selected for the patient.      Community Resources      No service has been selected for the patient.        Transportation Services  Private: Car    Final Discharge Disposition Code: 06 - home with home health care

## 2019-07-17 NOTE — NURSING NOTE
Spoke to Dr. Stokes to clarify anticoagulant; he wants patient to start ASA at home tomorrow morning; eliquis was only while in hospital

## 2019-07-17 NOTE — DISCHARGE SUMMARY
Discharge Summary    Date of Admission: 7/16/2019 11:51 AM  Date of Discharge:  7/17/2019    Discharge Diagnosis:   Status post total knee replacement, right [Z96.651]    PMHX:   Past Medical History:   Diagnosis Date   • Arthritis    • Diabetes mellitus (CMS/[facility])    • GERD (gastroesophageal reflux disease)    • Hyperlipidemia    • Hypertension    • RA (rheumatoid arthritis) (CMS/[facility])    • Sleep apnea     NO CPAP       Discharge Disposition      Procedures Performed  Procedure(s):  TOTAL KNEE ARTHROPLASTY       Indication for Admission  Patient is a 58 y.o. female with a long standing history of knee difficulty. The patient had failed all conservative measures pre-operatively and found to be a candidate for Total knee arthroplasty. Risks, benefits, outcomes of the procedure discussed and the patient wished to proceed. Obtained pre-operative medical clearance and found to be medically stable to undergo the above procedure. Patient tolerated the procedure well and was admitted after undergoing the above surgical procedure. They were admitted for post-operative pain control, medical management and physical therapy. The patient was started on antibiotics post-operatively per SCIP protocol and DVT prophylaxis started immediately. Patient was ambulatory on POD #0. Post-operative laboratory studies and vital signs remained stable. Patient was evaluated by Physical Therapy and found to be medically stable for discharge.    Consults:   Consults     No orders found for last 30 day(s).          Discharge Instructions:  Patient is weight bearing as tolerated on the operative leg.  Patient is to progress range of motion and ambulation as tolerated.  Use walker as needed for stability and gait.  May progress to cane as tolerated.  The dressing should be left on knee until post-operative day 7, and then removed. Then daily dry dressing changes with a non-adherent island dressing (I.e. Telfa pad). Dressing is waterproof.  Patient may shower.  Use ace wrap as needed for swelling.  Patient will follow-up in the office as directed by the operative surgeon.  Call the office at [phone] for any questions or concerns.      Discharge Medications: Per the discharge medication reconciliation list. Patient will be discharged on Eliquis 2.5mg PO BID x14 days for DVT prophylaxis.     Discharge Diet: Patient will continue with their regular home diet.    Activity at Discharge: Weight bearing as tolerated. Walk with the use of a walker and progress with physical therapy. Focus on aggressive range of motion.    Follow-up Appointments: Follow-up with Dr. Stokes in 2 weeks.     West Nix PA-C  07/17/19,  8:03 AM

## 2019-07-17 NOTE — DISCHARGE INSTRUCTIONS
Dr. Harshal Stokes Total Knee Joint Replacement Discharge Instructions:  Office Phone Number: [phone]    I. ACTIVITIES:  1. Exercises:  ? Complete exercise program as taught by the hospital physical therapist 2 times per day.  You may wean off the walker to a cane when directed by the physical therapist.  ? Exercise program will be advanced by the physical therapist  ? During the day be up ambulating every 2 hours (while awake) for short distances  ? Complete the ankle pump exercises at least 10 times per hour (while awake)  ? Elevate legs most of the day the first week post operatively and thereafter elevate legs when in bed and for at least 30 minutes during the day. Use cold packs 20-30 minutes approximately 5 times per day. This should be done before and after completing your exercises and at any time you are experiencing pain/ stiffness in your operative extremity.      2. Activities of Daily Living:  ? No tub baths, hot tubs, or swimming pools for 4 weeks  ? The clear dressing with thin white gauze strip dressing is waterproof.  You may shower without covering the dressing beginning 3 days after the operation.  After 7 days you may remove the dressing.  If the dressing becomes saturated prior to day 7, it may be changed.  After dressing removal, do not scrub or rub the incision. Allow skin glue to fall off over the next few weeks.  After the dressing is removed, simply let the water run over the incision and pat dry.    II. Restrictions  ? Follow any movement restrictions that was discussed with you by either Dr. Stokes or the physical therapist.     ? Avoid kneeling on the knee that was operated on  ? Dr. Stokes will discuss with you when you will be able to drive again at your first post-op appointment.  ? Weight bearing is as tolerated.  ? First week stay inside on even terrain. May go up and down stairs one stair at a time utilizing the hand rail.  ? Once you feel confident, you may venture  outside.    Exercises:  · Perform quad sets as instructed by the therapist at least 3 times a day  · Perform leg hangs with you heel placed on a chair or footrest and allow you knee to stretch towards a more straightened position several times a day  · Work on knee flexion exercises at least three times daily.  · Avoid placing a pillow under your knee as this will cause your knee to become more stiff throughout the recovery process.    III. Precautions:  ? Everyone that comes near you should wash their hands  ? No elective dental, genital-urinary, or colon procedures or surgical procedures for 12 weeks after surgery unless absolutely necessary.  ?  If dental work or surgical procedure is deemed absolutely necessary within 12 weeks of surgery, you will need to contact Dr. Stokes's  office as you will need to take antibiotics 1 hour prior to any dental work (including teeth cleanings).  ? Dr. Stokes will prescribe prophylactic antibiotics for all dental procedures for one year  as a precautionary measure to minimize risk of infection.  If you are a diabetic or take immunosuppressive medication, you may have to take prophylactic antibiotics the remainder of your life before dental work.    ? Avoid sick people. If you must be around someone who is ill, they should wear a mask.  ? Avoid visits to the Emergency Room or Urgent Care unless you are having a life threatening event.   ? If you have leg swelling you may wear leg compression stocking.   Stockings are to be placed on in the morning and removed at night. Monitor the stockings to ensure that any swelling is not causing the stockings to become too tight. In this case, remove stockings immediately.    IV. INCISION CARE:  ? Dr. Stokes takes great care in closing your incision to give you the best opportunity for a healthy incision with minimal scarring. He places sutures below the skin surface that will eventually dissolve.  The incision is then covered with a skin  glue which makes the incision water tight, and minimizes bleeding onto the dressing.  No staples are used.  Occasionally one of the buried stitches may come to the skin surface and may need to be removed.  Please resist the temptation of removing the stitch by yourself.   will be happy to remove it for you.  Bruising around the incision and thigh is normal and to be expected.  Please keep dressing in place at least until post-op day 7. You may remove and replace dressing before day 7 if the dressing begins to fall off or becomes saturated. Wash your hands and under your finger nails prior to dressing changes.  After day 7 as long as incision is dry and intact, you may leave the dressing off and open to air.    ? If dressing must be changed, utilize dry gauze and paper tape. Avoid touching the side of the gauze that goes against the incision with your hands.  ? No creams or ointments to the incision until permission given by Dr. Stokes.  ? Do not touch or pick at the incision, or try to remove any sutures or skin glue.  ? Check dressing every day and notify surgeon immediately if any of the following signs or symptoms are noted:  o Increase in redness  o Increase in swelling of the entire extremity that does not go away with elevation.  Notify office that you may have a blood clot.    o Drainage oozing from the incision  o Pulling apart of the edges of the incision  o Increase in overall body temperature (greater than 100.5 degrees)    V. Medications:   1. Anticoagulants: You will be discharged on an anticoagulant. This is a prophylactic medication that helps prevent blood clots during your post-operative period. The type and length of dosage varies based on your individual needs, procedure performed, and Dr. Chow preference.  ? While taking the anticoagulant, you should avoid taking any additional aspirin than what is prescribed.   ? Notify surgeon immediately if any awilda bleeding is noted in the urine,  stool, emesis, or from the nose or the incision. Blood in the stool will often appear as black rather than red. Blood in urine may appear as pink. Blood in emesis may appear as brown/black like coffee grounds.  ? You will need to apply pressure for longer periods of time to any cuts or abrasions to stop bleeding  ? Avoid alcohol while taking anticoagulants.    2. Stool Softeners: You will be at greater risk of constipation after surgery due to being less mobile and the pain medications.   ? Take stool softeners as instructed by your surgeon while on pain medications. Over the counter Colace 100 mg 1-2 capsules twice daily.   ? If stools become too loose or too frequent, please decreases the dosage or stop the stool softener.  ? If constipation occurs despite use of stool softeners, you are to continue the stool softeners and add a laxative (Milk of Magnesia 1 ounce daily as needed).  If no bowel movement occurs past 3 days, then purchase Magnesium citrate and drink 1/2 bottle every 8 hours (on ice tastes better) until success. If no bowel movement by post-operative day 5 please call Dr. Painter office for further instructions.   You may need to decrease or stop your pain medications if bowel movements to not occur.     ? Drink plenty of fluids, and eat fruits and vegetables during your recovery time.    3. Pain Medications utilized after surgery are narcotics and the law requires that the following information be given to all patients that are prescribed narcotics:  ? CLASSIFICATION: Pain medications are called Opioids and are narcotics  ? LEGALITIES: It is illegal to share narcotics with others and to drive within 24 hours of taking narcotics  ? POTENTIAL SIDE EFFECTS: Potential side effects of opioids include: nausea, vomiting, itching, dizziness, drowsiness, dry mouth, constipation, and difficulty urinating.  ? POTENTIAL ADVERSE EFFECTS:   o Opioid tolerance can develop with use of pain medications and this simply  "means that it requires more and more of the medication to control pain; however, this is seen more in patients that use opioids for longer periods of time.  o Opioid dependence can develop with use of Opioids and this simply means that to stop the medication can cause withdrawal symptoms; however, this is seen with patients that use Opioids for longer periods of time.  o Opioid addiction can develop with use of Opioids and the incidence of this is very unlikely in patients who take the medications as ordered and stop the medications as instructed.  o Opioid overdose can be dangerous, but is unlikely when the medication is taken as ordered and stopped when ordered. It is important not to mix opioids with alcohol or with and type of sedative such as Benadryl as this can lead to over sedation and respiratory difficulty.  ? DOSAGE:   o Pain medications will need to be taken consistently for the first few days to decrease pain and promote adequate pain relief and participation in physical therapy.  o After the initial surgical pain begins to resolve, you may begin to decrease the pain medication. By the end of 6 weeks, you should be off of pain medications except for before physical therapy or to help with pain when attempting to fall asleep.  Pain medications will be tapered to lesser dosages as you are further from your surgical day.  No pain medications will be provided after 3 months from surgery.     o Refills will not be given by the office during evening hours, or weekends.  o To seek refills on pain medications during the post-operative period, you must call the office 48 hours in advance to request the refill. The office will then notify you when to  the prescription. DO NOT wait until you are out of the medication to request a refill.  They can not be \"called in\" to the pharmacy.      How to Wean Off Pain Medication:   As you begin to feel better, gradually wean off the narcotic pain medication and begin " to use it only for breakthrough pain.  · Gradually reduce the total number of pills you take each day.  This can be done by taking fewer pills at a time or by increasing the amount of time between each pill.    · For example, if you were taking 2 pills every 6 hours you would be taking a total of 8 pills per day.  Reduce this to 6 pills per day, then 4-5 and so on.  This can be done by taking 1 pill at a time instead of 2, or by taking the pills every 8 hours instead of every 6.    · As you begin to wean from the narcotic pain medication, begin substituting with over the counter tylenol when you are not taking the narcotic.  Limit total tylenol dosage to less than 4 grams per day.    V. FOLLOW-UP VISITS:  ? You will need to follow up in the office with Dr. Stokes at 2 weeks.  Please call [phone] if you need to confirm or reschedule your appointment time.   ? If you have any concerns or suspected complications prior to your follow up visit, please call your surgeons office. Do not wait until your appointment time if you suspect complications. These will need to be addressed in the office promptly.

## 2019-07-17 NOTE — DISCHARGE PLACEMENT REQUEST
"Ken Galo (58 y.o. Female)     Date of Birth Social Security Number Address Home Phone MRN    1960  [address] [facility] [address] [phone] [id number]    Mu-ism Marital Status          Non-Sabianism Single       Admission Date Admission Type Admitting Provider Attending Provider Department, Room/Bed    7/16/19 Elective Harshal Stokes MD [place], [place], MD [address], P789/1    Discharge Date Discharge Disposition Discharge Destination         Home-[facility]              Attending Provider:  Harshal Stokes MD    Allergies:  Clindamycin/lincomycin    Isolation:  None   Infection:  None   Code Status:  CPR    Ht:  175.3 cm (69\")   Wt:  93 kg (205 lb 1.6 oz)    Admission Cmt:  None   Principal Problem:  None                Active Insurance as of 7/16/2019     Primary Coverage     Payor Plan Insurance Group Employer/Plan Group    [employer]UE [employer] PPO [id number]     Payor Plan Address Payor Plan Phone Number Payor Plan Fax Number Effective Dates    [address] [phone]  1/1/2018 - None Entered    [facility] [id number]       Subscriber Name Subscriber Birth Date Member ID       KEN GALO 1960 [id number]                 Emergency Contacts      (Rel.) Home Phone Work Phone Mobile Phone    MARGARITA AREVALO (Spouse) -- -- [phone]              "

## 2019-07-17 NOTE — PROGRESS NOTES
Orthopedic Progress Note    Subjective :   Patient seen and examined. Resting comfortably. No issues overnight. Pain controlled. Walked yesterday with PT.    Objective :    Vital signs in last 24 hours:  Temp:  [96.9 °F (36.1 °C)-98.7 °F (37.1 °C)] 97.5 °F (36.4 °C)  Heart Rate:  [] 92  Resp:  [16-20] 16  BP: (112-192)/() 153/84  Vitals:    07/16/19 1903 07/16/19 2300 07/17/19 0300 07/17/19 0700   BP: 133/74 160/82 152/80 153/84   BP Location: Left arm Left arm Left arm Left arm   Patient Position: Lying Lying Lying Lying   Pulse: 106 110 105 92   Resp: 16 16 16 16   Temp: 97.2 °F (36.2 °C) 97 °F (36.1 °C) 97.9 °F (36.6 °C) 97.5 °F (36.4 °C)   TempSrc: Oral Oral Oral Oral   SpO2: 98% 96% 96% 95%   Weight:       Height:           PHYSICAL EXAM:  Patient is calm, in no acute distress, awake and oriented x 3.  Dressing clean, dry and intact.  No signs of infection.  Swelling is appropriate.  Ecchymosis is appropriate in amount.  Homans test is negative.  Patient is neurovascularly intact distally.  EHL,FHL,TA,GS are intact  DP/TP 2+    LABS:  Results from last 7 days   Lab Units 07/17/19  0539   WBC 10*3/mm3 14.33*   HEMOGLOBIN g/dL 12.1   HEMATOCRIT % 38.0   PLATELETS 10*3/mm3 293     Results from last 7 days   Lab Units 07/17/19  0539   SODIUM mmol/L 138   POTASSIUM mmol/L 4.0   CHLORIDE mmol/L 99   CO2 mmol/L 25.6   BUN mg/dL 13   CREATININE mg/dL 0.80   GLUCOSE mg/dL 136*   CALCIUM mg/dL 9.5           ASSESSMENT:  Status post right total knee arthroplasty, POD#1    Plan:  Continue physical therapy, WBAT. ROM as tolerated with physical therapy  Continue SCDs, Continue Eliquis 2.5mg one tab PO BID x14 days for DVT prophylaxis  Dispo planning for home with home health when medically stable, possibly today if patient is stable after PT eval.   F/U Dr. Chow office in 2 weeks.     [place] PA-C    Date: 7/17/2019  Time: 8:02 AM

## 2019-10-05 ENCOUNTER — HOSPITAL ENCOUNTER (EMERGENCY)
Facility: HOSPITAL | Age: 59
Discharge: HOME | End: 2019-10-05
Attending: EMERGENCY MEDICINE | Admitting: EMERGENCY MEDICINE

## 2019-10-05 ENCOUNTER — APPOINTMENT (OUTPATIENT)
Dept: GENERAL RADIOLOGY | Facility: HOSPITAL | Age: 59
End: 2019-10-05

## 2019-10-05 VITALS
BODY MASS INDEX: 28.44 KG/M2 | HEIGHT: 69 IN | DIASTOLIC BLOOD PRESSURE: 87 MMHG | HEART RATE: 76 BPM | TEMPERATURE: 97.9 F | WEIGHT: 192 LBS | SYSTOLIC BLOOD PRESSURE: 143 MMHG | RESPIRATION RATE: 18 BRPM | OXYGEN SATURATION: 100 %

## 2019-10-05 DIAGNOSIS — J01.00 ACUTE NON-RECURRENT MAXILLARY SINUSITIS: Primary | ICD-10-CM

## 2019-10-05 LAB
ALBUMIN SERPL-MCNC: 4.5 G/DL (ref 3.5–5.2)
ALBUMIN/GLOB SERPL: 1.7 G/DL
ALP SERPL-CCNC: 99 U/L (ref 39–117)
ALT SERPL W P-5'-P-CCNC: 17 U/L (ref 1–33)
ANION GAP SERPL CALCULATED.3IONS-SCNC: 12.8 MMOL/L (ref 5–15)
AST SERPL-CCNC: 13 U/L (ref 1–32)
BASOPHILS # BLD AUTO: 0.02 10*3/MM3 (ref 0–0.2)
BASOPHILS NFR BLD AUTO: 0.3 % (ref 0–1.5)
BILIRUB SERPL-MCNC: 0.3 MG/DL (ref 0.2–1.2)
BUN BLD-MCNC: 13 MG/DL (ref 6–20)
BUN/CREAT SERPL: 16.3 (ref 7–25)
CALCIUM SPEC-SCNC: 9.6 MG/DL (ref 8.6–10.5)
CHLORIDE SERPL-SCNC: 102 MMOL/L (ref 98–107)
CO2 SERPL-SCNC: 25.2 MMOL/L (ref 22–29)
CREAT BLD-MCNC: 0.8 MG/DL (ref 0.57–1)
DEPRECATED RDW RBC AUTO: 43.1 FL (ref 37–54)
EOSINOPHIL # BLD AUTO: 0.05 10*3/MM3 (ref 0–0.4)
EOSINOPHIL NFR BLD AUTO: 0.8 % (ref 0.3–6.2)
ERYTHROCYTE [DISTWIDTH] IN BLOOD BY AUTOMATED COUNT: 14.5 % (ref 12.3–15.4)
GFR SERPL CREATININE-BSD FRML MDRD: 90 ML/MIN/1.73
GLOBULIN UR ELPH-MCNC: 2.7 GM/DL
GLUCOSE BLD-MCNC: 140 MG/DL (ref 65–99)
HCT VFR BLD AUTO: 40.8 % (ref 34–46.6)
HGB BLD-MCNC: 13.2 G/DL (ref 12–15.9)
HOLD SPECIMEN: NORMAL
IMM GRANULOCYTES # BLD AUTO: 0.02 10*3/MM3 (ref 0–0.05)
IMM GRANULOCYTES NFR BLD AUTO: 0.3 % (ref 0–0.5)
LYMPHOCYTES # BLD AUTO: 2.6 10*3/MM3 (ref 0.7–3.1)
LYMPHOCYTES NFR BLD AUTO: 42.8 % (ref 19.6–45.3)
MCH RBC QN AUTO: 26.8 PG (ref 26.6–33)
MCHC RBC AUTO-ENTMCNC: 32.4 G/DL (ref 31.5–35.7)
MCV RBC AUTO: 82.9 FL (ref 79–97)
MONOCYTES # BLD AUTO: 0.35 10*3/MM3 (ref 0.1–0.9)
MONOCYTES NFR BLD AUTO: 5.8 % (ref 5–12)
NEUTROPHILS # BLD AUTO: 3.04 10*3/MM3 (ref 1.7–7)
NEUTROPHILS NFR BLD AUTO: 50 % (ref 42.7–76)
NRBC BLD AUTO-RTO: 0 /100 WBC (ref 0–0.2)
PLATELET # BLD AUTO: 343 10*3/MM3 (ref 140–450)
PMV BLD AUTO: 10.1 FL (ref 6–12)
POTASSIUM BLD-SCNC: 3.6 MMOL/L (ref 3.5–5.2)
PROT SERPL-MCNC: 7.2 G/DL (ref 6–8.5)
RBC # BLD AUTO: 4.92 10*6/MM3 (ref 3.77–5.28)
SODIUM BLD-SCNC: 140 MMOL/L (ref 136–145)
TROPONIN T SERPL-MCNC: <0.01 NG/ML (ref 0–0.03)
WBC NRBC COR # BLD: 6.08 10*3/MM3 (ref 3.4–10.8)
WHOLE BLOOD HOLD SPECIMEN: NORMAL
WHOLE BLOOD HOLD SPECIMEN: NORMAL

## 2019-10-05 PROCEDURE — 85025 COMPLETE CBC W/AUTO DIFF WBC: CPT | Performed by: EMERGENCY MEDICINE

## 2019-10-05 PROCEDURE — 25010000002 KETOROLAC TROMETHAMINE PER 15 MG: Performed by: EMERGENCY MEDICINE

## 2019-10-05 PROCEDURE — 96374 THER/PROPH/DIAG INJ IV PUSH: CPT

## 2019-10-05 PROCEDURE — 99285 EMERGENCY DEPT VISIT HI MDM: CPT

## 2019-10-05 PROCEDURE — 93010 ELECTROCARDIOGRAM REPORT: CPT | Performed by: INTERNAL MEDICINE

## 2019-10-05 PROCEDURE — 93005 ELECTROCARDIOGRAM TRACING: CPT

## 2019-10-05 PROCEDURE — 80053 COMPREHEN METABOLIC PANEL: CPT | Performed by: EMERGENCY MEDICINE

## 2019-10-05 PROCEDURE — 71046 X-RAY EXAM CHEST 2 VIEWS: CPT

## 2019-10-05 PROCEDURE — 99284 EMERGENCY DEPT VISIT MOD MDM: CPT

## 2019-10-05 PROCEDURE — 93005 ELECTROCARDIOGRAM TRACING: CPT | Performed by: EMERGENCY MEDICINE

## 2019-10-05 PROCEDURE — 84484 ASSAY OF TROPONIN QUANT: CPT | Performed by: EMERGENCY MEDICINE

## 2019-10-05 RX ORDER — METHYLPREDNISOLONE 4 MG/1
TABLET ORAL
Qty: 1 EACH | Refills: 0 | Status: SHIPPED | OUTPATIENT
Start: 2019-10-05 | End: 2020-08-10 | Stop reason: ALTCHOICE

## 2019-10-05 RX ORDER — AZITHROMYCIN 250 MG/1
TABLET, FILM COATED ORAL
Qty: 6 TABLET | Refills: 0 | Status: SHIPPED | OUTPATIENT
Start: 2019-10-05 | End: 2020-08-10

## 2019-10-05 RX ORDER — SODIUM CHLORIDE 0.9 % (FLUSH) 0.9 %
10 SYRINGE (ML) INJECTION AS NEEDED
Status: DISCONTINUED | OUTPATIENT
Start: 2019-10-05 | End: 2019-10-05 | Stop reason: HOSPADM

## 2019-10-05 RX ORDER — KETOROLAC TROMETHAMINE 30 MG/ML
15 INJECTION, SOLUTION INTRAMUSCULAR; INTRAVENOUS ONCE
Status: COMPLETED | OUTPATIENT
Start: 2019-10-05 | End: 2019-10-05

## 2019-10-05 RX ADMIN — SODIUM CHLORIDE 1000 ML: 9 INJECTION, SOLUTION INTRAVENOUS at 14:44

## 2019-10-05 RX ADMIN — KETOROLAC TROMETHAMINE 15 MG: 30 INJECTION, SOLUTION INTRAMUSCULAR at 14:43

## 2019-10-05 NOTE — ED NOTES
Patient presents to ED via private vehicle.  Patient complains of dizziness for past 3 days, notes this morning that she had intense jaw pain. Breathing is even and unlabored, denies any chest discomfort.      Enrike Maldonado RN  10/05/19 0066

## 2019-10-05 NOTE — ED PROVIDER NOTES
" EMERGENCY DEPARTMENT ENCOUNTER    CHIEF COMPLAINT  Chief Complaint: Light headedness  History given by: pt  History limited by: none  Room Number: 34/34  PMD: Una Mistry APRN      HPI:  Pt is a 59 y.o. female who presents complaining of constant moderate light headedness that started three days ago. Pt admits to pressure to the sinus areas and states this morning she felt severe \"intense\" jaw pain that started around 0700 today and has been constant since but denies any CP or SOA. Pt states symptoms are worsened by laying down.     Duration:  Three days  Onset: gradual  Timing: constant  Quality: light headedness  Intensity/Severity: moderate  Progression: unchanged  Associated Symptoms: sinus pressure, jaw pain  Aggravating Factors: laying down  Previous Episodes: none  Treatment before arrival: none    PAST MEDICAL HISTORY  Active Ambulatory Problems     Diagnosis Date Noted   • Status post total knee replacement, right 07/16/2019     Resolved Ambulatory Problems     Diagnosis Date Noted   • No Resolved Ambulatory Problems     Past Medical History:   Diagnosis Date   • Arthritis    • Diabetes mellitus (CMS/HCC)    • GERD (gastroesophageal reflux disease)    • Hyperlipidemia    • Hypertension    • RA (rheumatoid arthritis) (CMS/HCC)    • Sleep apnea        PAST SURGICAL HISTORY  Past Surgical History:   Procedure Laterality Date   • COLON RESECTION     • COLONOSCOPY     • HYSTERECTOMY     • LAPAROSCOPIC CHOLECYSTECTOMY     • LUNG LOBECTOMY      RT LL.   • TOTAL KNEE ARTHROPLASTY Right 7/16/2019    Procedure: TOTAL KNEE ARTHROPLASTY;  Surgeon: Harshal Stokes MD;  Location: [facility];  Service: Orthopedics       FAMILY HISTORY  Family History   Problem Relation Age of Onset   • Malig Hyperthermia Neg Hx        SOCIAL HISTORY  Social History     Socioeconomic History   • Marital status: Single     Spouse name: Not on file   • Number of children: Not on file   • Years of education: Not on file   • " Highest education level: Not on file   Tobacco Use   • Smoking status: Former Smoker     Years: 30.00     Types: Cigarettes     Last attempt to quit: 2019     Years since quittin.2   • Tobacco comment: QUIT 8 DAYS AGO   Substance and Sexual Activity   • Alcohol use: No     Frequency: Never   • Drug use: No   • Sexual activity: Defer       ALLERGIES  Clindamycin/lincomycin    REVIEW OF SYSTEMS  Review of Systems   Constitutional: Negative for fever.   HENT: Positive for sinus pressure. Negative for sore throat.         Severe jaw pain   Eyes: Negative.    Respiratory: Negative for cough and shortness of breath.    Cardiovascular: Negative for chest pain.   Gastrointestinal: Negative for abdominal pain, diarrhea and vomiting.   Genitourinary: Negative for dysuria.   Musculoskeletal: Negative for neck pain.   Skin: Negative for rash.   Allergic/Immunologic: Negative.    Neurological: Positive for light-headedness. Negative for weakness, numbness and headaches.   Hematological: Negative.    Psychiatric/Behavioral: Negative.    All other systems reviewed and are negative.      PHYSICAL EXAM  ED Triage Vitals [10/05/19 1300]   Temp Heart Rate Resp BP SpO2   97.9 °F (36.6 °C) 87 18 150/88 96 %      Temp src Heart Rate Source Patient Position BP Location FiO2 (%)   -- -- Lying Right arm --       Physical Exam   Constitutional: She is oriented to person, place, and time. No distress.   HENT:   Head: Normocephalic and atraumatic.   Right Ear: Tympanic membrane normal.   Left Ear: Tympanic membrane normal.   Nose: Right sinus exhibits maxillary sinus tenderness. Left sinus exhibits maxillary sinus tenderness.   Mouth/Throat: Oropharynx is clear and moist.   bilateral ethmoid sinus tenderness. Inflammation and swelling to bilateral nares.   Eyes: EOM are normal. Pupils are equal, round, and reactive to light.   Neck: Normal range of motion. Neck supple.   Cardiovascular: Normal rate, regular rhythm and normal heart  sounds.   No murmur heard.  Pulmonary/Chest: Effort normal and breath sounds normal. No respiratory distress. She has no wheezes. She has no rales.   Abdominal: Soft. Bowel sounds are normal. She exhibits no distension. There is no tenderness. There is no rebound and no guarding.   Musculoskeletal: Normal range of motion. She exhibits no edema (pedal) or tenderness (calf).   Lymphadenopathy:     She has cervical adenopathy (bilateral submandibular).   Neurological: She is alert and oriented to person, place, and time. She has normal sensation and normal strength.   Skin: Skin is warm and dry. No rash noted.   Psychiatric: Mood and affect normal.   Nursing note and vitals reviewed.      LAB RESULTS  Lab Results (last 24 hours)     Procedure Component Value Units Date/Time    CBC & Differential [[id number]] Collected:  10/05/19 1402    Specimen:  Blood Updated:  10/05/19 1411    Narrative:       The following orders were created for panel order CBC & Differential.  Procedure                               Abnormality         Status                     ---------                               -----------         ------                     CBC Auto Differential[[id number]]        Normal              Final result                 Please view results for these tests on the individual orders.    Comprehensive Metabolic Panel [[id number]]  (Abnormal) Collected:  10/05/19 1402    Specimen:  Blood Updated:  10/05/19 1436     Glucose 140 mg/dL      BUN 13 mg/dL      Creatinine 0.80 mg/dL      Sodium 140 mmol/L      Potassium 3.6 mmol/L      Chloride 102 mmol/L      CO2 25.2 mmol/L      Calcium 9.6 mg/dL      Total Protein 7.2 g/dL      Albumin 4.50 g/dL      ALT (SGPT) 17 U/L      AST (SGOT) 13 U/L      Alkaline Phosphatase 99 U/L      Total Bilirubin 0.3 mg/dL      eGFR  African Amer 90 mL/min/1.73      Globulin 2.7 gm/dL      A/G Ratio 1.7 g/dL      BUN/Creatinine Ratio 16.3     Anion Gap 12.8 mmol/L     Narrative:       GFR  Normal >60  Chronic Kidney Disease <60  Kidney Failure <15    Troponin [[id number]]  (Normal) Collected:  10/05/19 1402    Specimen:  Blood Updated:  10/05/19 1436     Troponin T <0.010 ng/mL     Narrative:       Troponin T Reference Range:  <= 0.03 ng/mL-   Negative for AMI  >0.03 ng/mL-     Abnormal for myocardial necrosis.  Clinicians would have to utilize clinical acumen, EKG, Troponin and serial changes to determine if it is an Acute Myocardial Infarction or myocardial injury due to an underlying chronic condition.     CBC Auto Differential [[id number]]  (Normal) Collected:  10/05/19 1402    Specimen:  Blood Updated:  10/05/19 1411     WBC 6.08 10*3/mm3      RBC 4.92 10*6/mm3      Hemoglobin 13.2 g/dL      Hematocrit 40.8 %      MCV 82.9 fL      MCH 26.8 pg      MCHC 32.4 g/dL      RDW 14.5 %      RDW-SD 43.1 fl      MPV 10.1 fL      Platelets 343 10*3/mm3      Neutrophil % 50.0 %      Lymphocyte % 42.8 %      Monocyte % 5.8 %      Eosinophil % 0.8 %      Basophil % 0.3 %      Immature Grans % 0.3 %      Neutrophils, Absolute 3.04 10*3/mm3      Lymphocytes, Absolute 2.60 10*3/mm3      Monocytes, Absolute 0.35 10*3/mm3      Eosinophils, Absolute 0.05 10*3/mm3      Basophils, Absolute 0.02 10*3/mm3      Immature Grans, Absolute 0.02 10*3/mm3      nRBC 0.0 /100 WBC           I ordered the above labs and reviewed the results    RADIOLOGY  XR Chest 2 View   Final Result   1. Borderline cardiomegaly.       This report was finalized on 10/5/2019 3:03 PM by Dr. Omari Mae M.D.               I ordered the above noted radiological studies. Interpreted by radiologist. Reviewed by me in PACS.       PROCEDURES  Procedures    EKG          EKG time: 1353  Rhythm/Rate: normal sinus rhythm rate of 82  P waves and ND: normal  QRS, axis: normal   ST and T waves: normal     Interpreted Contemporaneously by me, independently viewed  Better compared to prior 7/9/19      PROGRESS AND CONSULTS       1536  Rechecked patient who  is resting comfortably. BP - 141/76, HR - 84, O2 - 97%.  Discussed all lab and test results. Discussed plan to discharge the pt with medications to help with symptom control. Pt understands and agrees with the plan. All questions have been answered.        MEDICAL DECISION MAKING  Results were reviewed/discussed with the patient and they were also made aware of online access. Pt also made aware that some labs, such as cultures, will not be resulted during ER visit and follow up with PMD is necessary.     MDM  Number of Diagnoses or Management Options     Amount and/or Complexity of Data Reviewed  Clinical lab tests: ordered and reviewed (Negative Troponin)  Tests in the radiology section of CPT®: reviewed and ordered (Chest XR - borderline cardiomegaly)  Tests in the medicine section of CPT®: ordered and reviewed (Refer to the procedure section of the note for EKG results)  Decide to obtain previous medical records or to obtain history from someone other than the patient: yes (Epic)  Review and summarize past medical records: yes (No past cardiac workup)           DIAGNOSIS  Final diagnoses:   Acute non-recurrent maxillary sinusitis       DISPOSITION  DISCHARGE    Patient discharged in stable condition.    Reviewed implications of results, diagnosis, meds, responsibility to follow up, warning signs and symptoms of possible worsening, potential complications and reasons to return to ER.    Patient/Family voiced understanding of above instructions.    Discussed plan for discharge, as there is no emergent indication for admission. Patient referred to primary care provider for BP management due to today's BP. Pt/family is agreeable and understands need for follow up and repeat testing.  Pt is aware that discharge does not mean that nothing is wrong but it indicates no emergency is present that requires admission and they must continue care with follow-up as given below or physician of their choice.      FOLLOW-UP  Una Mistry, APRN  [address] [place] [address]  [phone]    Schedule an appointment as soon as possible for a visit            Medication List      New Prescriptions    azithromycin 250 MG tablet  Commonly known as:  ZITHROMAX  Take 2 tablets the first day, then 1 tablet daily for 4 days.     methylPREDNISolone 4 MG tablet  Commonly known as:  MEDROL (LIYAH)  Take as directed on package instructions.              Latest Documented Vital Signs:  As of 9:40 PM  BP- 143/87 HR- 76 Temp- 97.9 °F (36.6 °C) O2 sat- 100%    --  Documentation assistance provided by eugene Rojas for Dr Durbin.  Information recorded by the scribe was done at my direction and has been verified and validated by me.       Brenda Rojas  10/05/19 5342       Anson Durbin MD  10/05/19 9434

## 2019-10-05 NOTE — DISCHARGE INSTRUCTIONS
Occasions as directed and follow-up with your family doctor.  Use over-the-counter Mucinex twice a day as needed to thin your mucus.  Use an over-the-counter sinus rinse once to twice a day to help rinse out your sinuses.  Please return to the emergency department if symptoms worsen with fever, worsening headache, chest pain or shortness of breath.

## 2019-10-05 NOTE — ED NOTES
"Patient states she was at her PCP on Wednesday, had her BP checked and it was approximately 120s/80s, and had her ears disimpacted of excess cerumen in bilateral ears. Dizziness has been ongoing since Thursday, and right jaw pain started this morning. Patient is aware of having a \"bad tooth\" that needs to be pulled. Patient reports that even while lying flat, she has felt at times that she may potentially be close to having a syncopal episode.     Michelle Jc RN  10/05/19 1400    "

## 2019-10-08 ENCOUNTER — OFFICE (AMBULATORY)
Dept: URBAN - METROPOLITAN AREA CLINIC 75 | Facility: CLINIC | Age: 59
End: 2019-10-08
Payer: COMMERCIAL

## 2019-10-08 VITALS
SYSTOLIC BLOOD PRESSURE: 126 MMHG | WEIGHT: 169 LBS | DIASTOLIC BLOOD PRESSURE: 72 MMHG | HEIGHT: 69 IN | HEART RATE: 74 BPM | RESPIRATION RATE: 14 BRPM

## 2019-10-08 DIAGNOSIS — R19.7 DIARRHEA, UNSPECIFIED: ICD-10-CM

## 2019-10-08 DIAGNOSIS — R63.0 ANOREXIA: ICD-10-CM

## 2019-10-08 DIAGNOSIS — K57.92 DIVERTICULITIS OF INTESTINE, PART UNSPECIFIED, WITHOUT PERFO: ICD-10-CM

## 2019-10-08 DIAGNOSIS — R10.32 LEFT LOWER QUADRANT PAIN: ICD-10-CM

## 2019-10-08 PROCEDURE — 99204 OFFICE O/P NEW MOD 45 MIN: CPT | Performed by: INTERNAL MEDICINE

## 2019-10-08 RX ORDER — METRONIDAZOLE 500 MG/1
TABLET, FILM COATED ORAL
Qty: 21 | Refills: 0 | Status: COMPLETED
Start: 2019-10-08 | End: 2020-08-18

## 2019-10-08 RX ORDER — CIPROFLOXACIN 500 MG/1
1000 TABLET, FILM COATED ORAL
Qty: 14 | Refills: 0 | Status: COMPLETED
Start: 2019-10-08 | End: 2019-10-16

## 2019-10-11 ENCOUNTER — OFFICE (AMBULATORY)
Dept: URBAN - METROPOLITAN AREA LAB 2 | Facility: LAB | Age: 59
End: 2019-10-11
Payer: COMMERCIAL

## 2019-10-11 ENCOUNTER — HOSPITAL ENCOUNTER (EMERGENCY)
Facility: HOSPITAL | Age: 59
Discharge: HOME | End: 2019-10-11
Attending: EMERGENCY MEDICINE | Admitting: EMERGENCY MEDICINE

## 2019-10-11 VITALS
OXYGEN SATURATION: 99 % | RESPIRATION RATE: 18 BRPM | DIASTOLIC BLOOD PRESSURE: 77 MMHG | BODY MASS INDEX: 30.53 KG/M2 | SYSTOLIC BLOOD PRESSURE: 132 MMHG | WEIGHT: 190 LBS | HEIGHT: 66 IN | TEMPERATURE: 98.3 F | HEART RATE: 78 BPM

## 2019-10-11 DIAGNOSIS — R55 NEAR SYNCOPE: ICD-10-CM

## 2019-10-11 DIAGNOSIS — K52.9 CHRONIC DIARRHEA: ICD-10-CM

## 2019-10-11 DIAGNOSIS — K58.9 IRRITABLE BOWEL SYNDROME WITHOUT DIARRHEA: ICD-10-CM

## 2019-10-11 DIAGNOSIS — R42 LIGHTHEADEDNESS: Primary | ICD-10-CM

## 2019-10-11 DIAGNOSIS — E87.0 HYPERNATREMIA: ICD-10-CM

## 2019-10-11 LAB
ALBUMIN SERPL-MCNC: 4.6 G/DL (ref 3.5–5.2)
ALBUMIN/GLOB SERPL: 1.6 G/DL
ALP SERPL-CCNC: 98 U/L (ref 39–117)
ALT SERPL W P-5'-P-CCNC: 16 U/L (ref 1–33)
ANION GAP SERPL CALCULATED.3IONS-SCNC: 10.6 MMOL/L (ref 5–15)
AST SERPL-CCNC: 15 U/L (ref 1–32)
BASOPHILS # BLD AUTO: 0.02 10*3/MM3 (ref 0–0.2)
BASOPHILS NFR BLD AUTO: 0.2 % (ref 0–1.5)
BILIRUB SERPL-MCNC: 0.4 MG/DL (ref 0.2–1.2)
BUN BLD-MCNC: 12 MG/DL (ref 6–20)
BUN/CREAT SERPL: 12.6 (ref 7–25)
CALCIUM SPEC-SCNC: 9.7 MG/DL (ref 8.6–10.5)
CHLORIDE SERPL-SCNC: 105 MMOL/L (ref 98–107)
CO2 SERPL-SCNC: 30.4 MMOL/L (ref 22–29)
CREAT BLD-MCNC: 0.95 MG/DL (ref 0.57–1)
DEPRECATED RDW RBC AUTO: 44.9 FL (ref 37–54)
EOSINOPHIL # BLD AUTO: 0.06 10*3/MM3 (ref 0–0.4)
EOSINOPHIL NFR BLD AUTO: 0.7 % (ref 0.3–6.2)
ERYTHROCYTE [DISTWIDTH] IN BLOOD BY AUTOMATED COUNT: 14.9 % (ref 12.3–15.4)
GFR SERPL CREATININE-BSD FRML MDRD: 73 ML/MIN/1.73
GLOBULIN UR ELPH-MCNC: 2.8 GM/DL
GLUCOSE BLD-MCNC: 127 MG/DL (ref 65–99)
HCT VFR BLD AUTO: 41.8 % (ref 34–46.6)
HGB BLD-MCNC: 13.3 G/DL (ref 12–15.9)
IMM GRANULOCYTES # BLD AUTO: 0.02 10*3/MM3 (ref 0–0.05)
IMM GRANULOCYTES NFR BLD AUTO: 0.2 % (ref 0–0.5)
LYMPHOCYTES # BLD AUTO: 2.37 10*3/MM3 (ref 0.7–3.1)
LYMPHOCYTES NFR BLD AUTO: 28.7 % (ref 19.6–45.3)
MAGNESIUM SERPL-MCNC: 1.7 MG/DL (ref 1.6–2.6)
MCH RBC QN AUTO: 26.5 PG (ref 26.6–33)
MCHC RBC AUTO-ENTMCNC: 31.8 G/DL (ref 31.5–35.7)
MCV RBC AUTO: 83.3 FL (ref 79–97)
MONOCYTES # BLD AUTO: 0.57 10*3/MM3 (ref 0.1–0.9)
MONOCYTES NFR BLD AUTO: 6.9 % (ref 5–12)
NEUTROPHILS # BLD AUTO: 5.21 10*3/MM3 (ref 1.7–7)
NEUTROPHILS NFR BLD AUTO: 63.3 % (ref 42.7–76)
NRBC BLD AUTO-RTO: 0 /100 WBC (ref 0–0.2)
PLATELET # BLD AUTO: 369 10*3/MM3 (ref 140–450)
PMV BLD AUTO: 10.4 FL (ref 6–12)
POTASSIUM BLD-SCNC: 3.9 MMOL/L (ref 3.5–5.2)
PROT SERPL-MCNC: 7.4 G/DL (ref 6–8.5)
RBC # BLD AUTO: 5.02 10*6/MM3 (ref 3.77–5.28)
SODIUM BLD-SCNC: 146 MMOL/L (ref 136–145)
TROPONIN T SERPL-MCNC: <0.01 NG/ML (ref 0–0.03)
WBC NRBC COR # BLD: 8.25 10*3/MM3 (ref 3.4–10.8)

## 2019-10-11 PROCEDURE — 93010 ELECTROCARDIOGRAM REPORT: CPT | Performed by: INTERNAL MEDICINE

## 2019-10-11 PROCEDURE — 83735 ASSAY OF MAGNESIUM: CPT | Performed by: EMERGENCY MEDICINE

## 2019-10-11 PROCEDURE — 93005 ELECTROCARDIOGRAM TRACING: CPT | Performed by: EMERGENCY MEDICINE

## 2019-10-11 PROCEDURE — 84484 ASSAY OF TROPONIN QUANT: CPT | Performed by: EMERGENCY MEDICINE

## 2019-10-11 PROCEDURE — 99284 EMERGENCY DEPT VISIT MOD MDM: CPT

## 2019-10-11 PROCEDURE — 80053 COMPREHEN METABOLIC PANEL: CPT | Performed by: EMERGENCY MEDICINE

## 2019-10-11 PROCEDURE — 85025 COMPLETE CBC W/AUTO DIFF WBC: CPT | Performed by: EMERGENCY MEDICINE

## 2019-10-11 PROCEDURE — 83993 ASSAY FOR CALPROTECTIN FECAL: CPT | Performed by: INTERNAL MEDICINE

## 2019-10-11 RX ORDER — SODIUM CHLORIDE 0.9 % (FLUSH) 0.9 %
10 SYRINGE (ML) INJECTION AS NEEDED
Status: DISCONTINUED | OUTPATIENT
Start: 2019-10-11 | End: 2019-10-11 | Stop reason: HOSPADM

## 2019-10-11 RX ADMIN — SODIUM CHLORIDE, POTASSIUM CHLORIDE, SODIUM LACTATE AND CALCIUM CHLORIDE 1000 ML: 600; 310; 30; 20 INJECTION, SOLUTION INTRAVENOUS at 08:40

## 2019-10-11 NOTE — ED NOTES
EMS reports the pt called 911 for diarrhea and dizziness X 2.5 weeks, pt was seen in ED last week for same symptoms and diagnosed with sinus infection. Pt reports she called 911 this morning because when she woke up she felt like she was going to pass out.       Brigid Thompson, RN  10/11/19 0921

## 2019-10-11 NOTE — ED PROVIDER NOTES
" EMERGENCY DEPARTMENT ENCOUNTER    Room Number:  15/15  Date seen:  10/11/2019  Time seen: 8:25 AM  PCP: Una Mistry APRN  Historian: Pt    HPI:  Chief Complaint: Dizziness  A complete HPI/ROS/PMH/PSH/SH/FH are unobtainable due to: None  Context: Ken Galo is a 59 y.o. female who presents to the ED c/o constant, worsening dizziness that began about a week ago. She also c/o constant diarrhea with as many as 20 episodes a day and has been unable to sleep at night. Pt states she was here last week for similar sxs but sxs have not resolved. She states she \"has never felt like this before\".  She denies any aggravating or alleviating factors at this time. Pt was previously taking Lorazepam and has for the past 15 years. However, her APRN recently switched her to Wellbutrin last week. Last took benzos about 1.5-2 weeks ago. She states the dizziness began before the change in medication. She denies CP, diaphoresis, trouble swallowing or ambulatory difficulty. SHx includes R knee replacement. PMHx includes diabetes and diverticulitis.    Quality: Dizziness  Intensity/Severity: Moderate  Duration: A week ago  Onset quality: Gradual  Timing: Constant  Progression: Worsening  Aggravating Factors: None  Alleviating Factors: None  Previous Episodes: Last week-similar sxs  Treatment before arrival: None  Associated Symptoms: diarrhea, unexpected weight loss, inability to sleep at night    PAST MEDICAL HISTORY  Active Ambulatory Problems     Diagnosis Date Noted   • Status post total knee replacement, right 07/16/2019     Resolved Ambulatory Problems     Diagnosis Date Noted   • No Resolved Ambulatory Problems     Past Medical History:   Diagnosis Date   • Arthritis    • Diabetes mellitus (CMS/[facility])    • GERD (gastroesophageal reflux disease)    • Hyperlipidemia    • Hypertension    • RA (rheumatoid arthritis) (CMS/[facility])    • Sleep apnea          PAST SURGICAL HISTORY  Past Surgical History:   Procedure Laterality " Date   • COLON RESECTION     • COLONOSCOPY     • HYSTERECTOMY     • LAPAROSCOPIC CHOLECYSTECTOMY     • LUNG LOBECTOMY      RT LL.   • TOTAL KNEE ARTHROPLASTY Right 2019    Procedure: TOTAL KNEE ARTHROPLASTY;  Surgeon: Harshal Stokes MD;  Location: [facility] OR;  Service: Orthopedics         FAMILY HISTORY  Family History   Problem Relation Age of Onset   • Malig Hyperthermia Neg Hx          SOCIAL HISTORY  Social History     Socioeconomic History   • Marital status:      Spouse name: Not on file   • Number of children: Not on file   • Years of education: Not on file   • Highest education level: Not on file   Tobacco Use   • Smoking status: Former Smoker     Years: 30.00     Types: Cigarettes     Last attempt to quit: 2019     Years since quittin.2   • Tobacco comment: QUIT 8 DAYS AGO   Substance and Sexual Activity   • Alcohol use: No     Frequency: Never   • Drug use: No   • Sexual activity: Defer         ALLERGIES  Clindamycin/lincomycin        REVIEW OF SYSTEMS  Review of Systems     All systems reviewed and negative except for those discussed in HPI.       PHYSICAL EXAM  ED Triage Vitals [10/11/19 0746]   Temp Heart Rate Resp BP SpO2   97.9 °F (36.6 °C) 96 18 125/83 96 %      Temp src Heart Rate Source Patient Position BP Location FiO2 (%)   Tympanic -- -- -- --         GENERAL: not distressed  HENT: nares patent  EYES: no scleral icterus  CV: regular rhythm, regular rate, no murmur  RESPIRATORY: normal effort, CTAB  ABDOMEN: soft, nontender  MUSCULOSKELETAL: no deformity  NEURO:   Mental status  LOC: awake, alert and fully oriented to person, place, time, and situation.  Attention and memory intact. Fund of knowledge normal for age and education.  Language is fluent with normal naming, comprehension, and repetition.   There is no neglect.    Cranial nerves  PERRL  Visual fields full to confrontation.  EOMI with full versions, normal pursuits, and saccades.   Facial strength is full and  symmetric.   Facial sensation is intact in V1-V3.  Hearing is intact to finger rub bilaterally.   Tongue protrudes midline.   Uvula and palate elevate symmetrically.  Speech is clear without notable labial, lingual, or guttural dysarthria.   Shoulder shrug and sternocleidomastoid activation are full and symmetric.    Motor  Normal bulk and tone.   Strength is 5/5 in bilateral upper and lower extremities.     There is no pronator drift.    Sensory  Sensation is intact to light touch in bilateral upper and lower extremities.     Coordination  Normal rapid alternating movements.  Normal finger-nose-finger and heel-to-shin testing.    Station and gait  Normal station.  Normal gait.    Reflexes  No meningismus.       NIHSS 0   No nystagmus with primary gaze.  No nystagmus with extraocular movement.  Normal test of skew.  Head impulse test: n/a    Normal hearing to light finger rub bilaterally.  No ptosis.  No anisocoria.  Normal phonation.  Normal facial sensation to pain/temperature.  No facial weakness.    No limb ataxia.  No truncal ataxia.  No gait ataxia.     SKIN: warm, dry    Vital signs and nursing notes reviewed.      LAB RESULTS  Recent Results (from the past 24 hour(s))   Comprehensive Metabolic Panel    Collection Time: 10/11/19  8:41 AM   Result Value Ref Range    Glucose 127 (H) 65 - 99 mg/dL    BUN 12 6 - 20 mg/dL    Creatinine 0.95 0.57 - 1.00 mg/dL    Sodium 146 (H) 136 - 145 mmol/L    Potassium 3.9 3.5 - 5.2 mmol/L    Chloride 105 98 - 107 mmol/L    CO2 30.4 (H) 22.0 - 29.0 mmol/L    Calcium 9.7 8.6 - 10.5 mg/dL    Total Protein 7.4 6.0 - 8.5 g/dL    Albumin 4.60 3.50 - 5.20 g/dL    ALT (SGPT) 16 1 - 33 U/L    AST (SGOT) 15 1 - 32 U/L    Alkaline Phosphatase 98 39 - 117 U/L    Total Bilirubin 0.4 0.2 - 1.2 mg/dL    eGFR  African Amer 73 >60 mL/min/1.73    Globulin 2.8 gm/dL    A/G Ratio 1.6 g/dL    BUN/Creatinine Ratio 12.6 7.0 - 25.0    Anion Gap 10.6 5.0 - 15.0 mmol/L   Troponin    Collection Time:  10/11/19  8:41 AM   Result Value Ref Range    Troponin T <0.010 0.000 - 0.030 ng/mL   Magnesium    Collection Time: 10/11/19  8:41 AM   Result Value Ref Range    Magnesium 1.7 1.6 - 2.6 mg/dL   CBC Auto Differential    Collection Time: 10/11/19  8:41 AM   Result Value Ref Range    WBC 8.25 3.40 - 10.80 10*3/mm3    RBC 5.02 3.77 - 5.28 10*6/mm3    Hemoglobin 13.3 12.0 - 15.9 g/dL    Hematocrit 41.8 34.0 - 46.6 %    MCV 83.3 79.0 - 97.0 fL    MCH 26.5 (L) 26.6 - 33.0 pg    MCHC 31.8 31.5 - 35.7 g/dL    RDW 14.9 12.3 - 15.4 %    RDW-SD 44.9 37.0 - 54.0 fl    MPV 10.4 6.0 - 12.0 fL    Platelets 369 140 - 450 10*3/mm3    Neutrophil % 63.3 42.7 - 76.0 %    Lymphocyte % 28.7 19.6 - 45.3 %    Monocyte % 6.9 5.0 - 12.0 %    Eosinophil % 0.7 0.3 - 6.2 %    Basophil % 0.2 0.0 - 1.5 %    Immature Grans % 0.2 0.0 - 0.5 %    Neutrophils, Absolute 5.21 1.70 - 7.00 10*3/mm3    Lymphocytes, Absolute 2.37 0.70 - 3.10 10*3/mm3    Monocytes, Absolute 0.57 0.10 - 0.90 10*3/mm3    Eosinophils, Absolute 0.06 0.00 - 0.40 10*3/mm3    Basophils, Absolute 0.02 0.00 - 0.20 10*3/mm3    Immature Grans, Absolute 0.02 0.00 - 0.05 10*3/mm3    nRBC 0.0 0.0 - 0.2 /100 WBC       Ordered the above labs and reviewed the results.    PROCEDURES  Procedures      MEDICATIONS GIVEN IN ER  Medications   lactated ringers bolus 1,000 mL (0 mL Intravenous Stopped 10/11/19 0910)         MEDICATIONS GIVEN IN ER    Medications   lactated ringers bolus 1,000 mL (0 mL Intravenous Stopped 10/11/19 0910)         PROGRESS, DATA ANALYSIS, CONSULTS, AND MEDICAL DECISION MAKING    All labs have been independently reviewed by me.  All radiology studies have been reviewed by me and discussed with radiologist dictating the report.   EKG's independently viewed and interpreted by me.  Discussion below represents my analysis of pertinent findings related to patient's condition, differential diagnosis, treatment plan and final disposition.      ED Course as of Oct 11 1507   Fri  Oct 11, 2019   0836 Patient presents complaining of near syncope.  States that she was sitting in bed when she became lightheaded and dizzy.  States this lasted for about 15 to 20 minutes.  She then letter states that she has had this lightheadedness that is lasted for over a week and a half.  There is some inconsistency in her history.  Differential includes recent discontinuation of Lorazepam, recent initiation of Wellbutrin, relative volume depletion related to her chronic diarrhea, cardiac arrhythmia, intracranial process.  She has a normal neurologic exam.  I have low suspicion for stroke or other intracranial pathology.  Therefore, I do not believe that a CT scan of her head is clinically indicated at this time.  She is ambulatory without any difficulty aside from a mild limp due to her recent right knee replacement in July.  She has no chest pain or shortness of breath and I have low suspicion for pulmonary embolism.  [TD]   0913 EKG obtained at 8:49 AM.  Interpreted by myself.  Sinus rhythm.  Heart rate 83.  Low voltage in the precordial leads.  Normal axis.  Normal intervals.  No acute ST changes.  [TD]   0914 On medical chart review, and old EKG was obtained from 10/5/2019.  There is no significant change between the old and current EKG.  [TD]   0923 Mild hypernatremia Sodium: (!) 146 [TD]   0949 I updated the patient on the results of her test.  She thinks that her symptoms may be due to her being scared about her symptoms, notably that her gastroenterologist has not yet ruled out colon cancer.  She has a colonoscopy scheduled for next month.  Given her reassuring neurologic exam, I see no indication for CT imaging or MRI of the brain.  I suspect that her symptoms are multifactorial due to anxiety, mild volume depletion, and med changes.   [TD]      ED Course User Index  [TD] Kobe Sheffield II, MD       AS OF 3:07 PM VITALS:    BP - 132/77  HR - 78  TEMP - 98.3 °F (36.8 °C)  02 SATS -  99%        DIAGNOSIS  Final diagnoses:   Lightheadedness   Chronic diarrhea   Near syncope   Hypernatremia         DISPOSITION  DISCHARGE    Patient discharged in stable condition.    Reviewed implications of results, diagnosis, meds, responsibility to follow up, warning signs and symptoms of possible worsening, potential complications and reasons to return to ER.    Patient/Family voiced understanding of above instructions.    Discussed plan for discharge, as there is no emergent indication for admission. Patient referred to primary care provider for BP management due to today's BP. Pt/family is agreeable and understands need for follow up and repeat testing.  Pt is aware that discharge does not mean that nothing is wrong but it indicates no emergency is present that requires admission and they must continue care with follow-up as given below or physician of their choice.     FOLLOW-UP  Una Mistry, APRN  [address] [place] [address]  [phone]    Schedule an appointment as soon as possible for a visit in 1 week           Medication List      No changes were made to your prescriptions during this visit.       --  Documentation assistance provided by eugene Sheffield II, MD for Dr. Kobe Sheffield MD.  Information recorded by the eugene was done at my direction and has been verified and validated by me.         Jeannette Peoples  10/11/19 6225       Kobe Sheffield II, MD  10/11/19 7076

## 2019-10-16 ENCOUNTER — OFFICE (AMBULATORY)
Dept: URBAN - METROPOLITAN AREA CLINIC 75 | Facility: CLINIC | Age: 59
End: 2019-10-16
Payer: COMMERCIAL

## 2019-10-16 VITALS
WEIGHT: 185 LBS | HEIGHT: 69 IN | OXYGEN SATURATION: 99 % | SYSTOLIC BLOOD PRESSURE: 130 MMHG | SYSTOLIC BLOOD PRESSURE: 133 MMHG | RESPIRATION RATE: 10 BRPM | RESPIRATION RATE: 14 BRPM | DIASTOLIC BLOOD PRESSURE: 77 MMHG | DIASTOLIC BLOOD PRESSURE: 99 MMHG | DIASTOLIC BLOOD PRESSURE: 109 MMHG | OXYGEN SATURATION: 93 % | SYSTOLIC BLOOD PRESSURE: 90 MMHG | HEART RATE: 107 BPM | RESPIRATION RATE: 16 BRPM | TEMPERATURE: 97.6 F | HEART RATE: 96 BPM | HEART RATE: 92 BPM | DIASTOLIC BLOOD PRESSURE: 93 MMHG | HEART RATE: 89 BPM | OXYGEN SATURATION: 95 % | RESPIRATION RATE: 17 BRPM | HEART RATE: 103 BPM | DIASTOLIC BLOOD PRESSURE: 65 MMHG | OXYGEN SATURATION: 96 % | RESPIRATION RATE: 21 BRPM | TEMPERATURE: 97 F | SYSTOLIC BLOOD PRESSURE: 115 MMHG | SYSTOLIC BLOOD PRESSURE: 117 MMHG | HEART RATE: 90 BPM | OXYGEN SATURATION: 98 % | SYSTOLIC BLOOD PRESSURE: 97 MMHG | SYSTOLIC BLOOD PRESSURE: 102 MMHG | SYSTOLIC BLOOD PRESSURE: 131 MMHG | DIASTOLIC BLOOD PRESSURE: 69 MMHG | RESPIRATION RATE: 20 BRPM | RESPIRATION RATE: 18 BRPM | OXYGEN SATURATION: 97 % | DIASTOLIC BLOOD PRESSURE: 89 MMHG | DIASTOLIC BLOOD PRESSURE: 76 MMHG | DIASTOLIC BLOOD PRESSURE: 67 MMHG | RESPIRATION RATE: 11 BRPM | DIASTOLIC BLOOD PRESSURE: 78 MMHG | RESPIRATION RATE: 19 BRPM | DIASTOLIC BLOOD PRESSURE: 71 MMHG | DIASTOLIC BLOOD PRESSURE: 74 MMHG | HEART RATE: 94 BPM | SYSTOLIC BLOOD PRESSURE: 137 MMHG | HEART RATE: 97 BPM | SYSTOLIC BLOOD PRESSURE: 143 MMHG | HEART RATE: 95 BPM | SYSTOLIC BLOOD PRESSURE: 125 MMHG | SYSTOLIC BLOOD PRESSURE: 108 MMHG | HEART RATE: 99 BPM | SYSTOLIC BLOOD PRESSURE: 105 MMHG

## 2019-10-16 VITALS
DIASTOLIC BLOOD PRESSURE: 86 MMHG | HEART RATE: 80 BPM | RESPIRATION RATE: 14 BRPM | HEIGHT: 69 IN | WEIGHT: 187 LBS | SYSTOLIC BLOOD PRESSURE: 136 MMHG

## 2019-10-16 DIAGNOSIS — R19.7 DIARRHEA, UNSPECIFIED: ICD-10-CM

## 2019-10-16 DIAGNOSIS — R19.4 CHANGE IN BOWEL HABIT: ICD-10-CM

## 2019-10-16 DIAGNOSIS — K59.00 CONSTIPATION, UNSPECIFIED: ICD-10-CM

## 2019-10-16 DIAGNOSIS — R10.32 LEFT LOWER QUADRANT PAIN: ICD-10-CM

## 2019-10-16 DIAGNOSIS — R14.0 ABDOMINAL DISTENSION (GASEOUS): ICD-10-CM

## 2019-10-16 PROCEDURE — 99214 OFFICE O/P EST MOD 30 MIN: CPT | Performed by: INTERNAL MEDICINE

## 2019-10-16 RX ORDER — DICYCLOMINE HYDROCHLORIDE 20 MG/1
80 TABLET ORAL
Qty: 120 | Refills: 12 | Status: COMPLETED
Start: 2019-10-16 | End: 2020-03-24

## 2019-10-17 ENCOUNTER — OFFICE (AMBULATORY)
Dept: URBAN - METROPOLITAN AREA PATHOLOGY 4 | Facility: PATHOLOGY | Age: 59
End: 2019-10-17
Payer: COMMERCIAL

## 2019-10-17 ENCOUNTER — AMBULATORY SURGICAL CENTER (AMBULATORY)
Dept: URBAN - METROPOLITAN AREA SURGERY 17 | Facility: SURGERY | Age: 59
End: 2019-10-17
Payer: COMMERCIAL

## 2019-10-17 DIAGNOSIS — K29.50 UNSPECIFIED CHRONIC GASTRITIS WITHOUT BLEEDING: ICD-10-CM

## 2019-10-17 DIAGNOSIS — D12.0 BENIGN NEOPLASM OF CECUM: ICD-10-CM

## 2019-10-17 DIAGNOSIS — R10.32 LEFT LOWER QUADRANT PAIN: ICD-10-CM

## 2019-10-17 DIAGNOSIS — R63.0 ANOREXIA: ICD-10-CM

## 2019-10-17 DIAGNOSIS — D12.4 BENIGN NEOPLASM OF DESCENDING COLON: ICD-10-CM

## 2019-10-17 DIAGNOSIS — K31.7 POLYP OF STOMACH AND DUODENUM: ICD-10-CM

## 2019-10-17 DIAGNOSIS — K57.92 DIVERTICULITIS OF INTESTINE, PART UNSPECIFIED, WITHOUT PERFO: ICD-10-CM

## 2019-10-17 DIAGNOSIS — R14.0 ABDOMINAL DISTENSION (GASEOUS): ICD-10-CM

## 2019-10-17 DIAGNOSIS — R10.84 GENERALIZED ABDOMINAL PAIN: ICD-10-CM

## 2019-10-17 DIAGNOSIS — K59.00 CONSTIPATION, UNSPECIFIED: ICD-10-CM

## 2019-10-17 LAB
GI HISTOLOGY: A. SELECT: (no result)
GI HISTOLOGY: B. UNSPECIFIED: (no result)
GI HISTOLOGY: C. SELECT: (no result)
GI HISTOLOGY: D. SELECT: (no result)
GI HISTOLOGY: PDF REPORT: (no result)

## 2019-10-17 PROCEDURE — 45380 COLONOSCOPY AND BIOPSY: CPT | Mod: 59 | Performed by: INTERNAL MEDICINE

## 2019-10-17 PROCEDURE — 45385 COLONOSCOPY W/LESION REMOVAL: CPT | Performed by: INTERNAL MEDICINE

## 2019-10-17 PROCEDURE — 43239 EGD BIOPSY SINGLE/MULTIPLE: CPT | Performed by: INTERNAL MEDICINE

## 2019-10-17 PROCEDURE — 88305 TISSUE EXAM BY PATHOLOGIST: CPT | Performed by: INTERNAL MEDICINE

## 2019-10-17 NOTE — INTERFACERESULTNOTES
please notify patient she had mild gastritis and her polyps were precancerous but benign she needs repeat CN in three years

## 2019-10-21 ENCOUNTER — TRANSCRIBE ORDERS (OUTPATIENT)
Dept: ADMINISTRATIVE | Facility: HOSPITAL | Age: 59
End: 2019-10-21

## 2019-10-21 DIAGNOSIS — R10.32 ABDOMINAL PAIN, LEFT LOWER QUADRANT: Primary | ICD-10-CM

## 2020-03-24 ENCOUNTER — OFFICE (AMBULATORY)
Dept: URBAN - METROPOLITAN AREA TELEHEALTH 6 | Facility: TELEHEALTH | Age: 60
End: 2020-03-24
Payer: COMMERCIAL

## 2020-03-24 VITALS — WEIGHT: 185 LBS | HEIGHT: 69 IN

## 2020-03-24 DIAGNOSIS — R14.0 ABDOMINAL DISTENSION (GASEOUS): ICD-10-CM

## 2020-03-24 DIAGNOSIS — K58.2 MIXED IRRITABLE BOWEL SYNDROME: ICD-10-CM

## 2020-03-24 DIAGNOSIS — R10.32 LEFT LOWER QUADRANT PAIN: ICD-10-CM

## 2020-03-24 DIAGNOSIS — R19.4 CHANGE IN BOWEL HABIT: ICD-10-CM

## 2020-03-24 PROCEDURE — 99214 OFFICE O/P EST MOD 30 MIN: CPT | Performed by: INTERNAL MEDICINE

## 2020-07-27 ENCOUNTER — OFFICE VISIT (OUTPATIENT)
Dept: CARDIOLOGY | Facility: CLINIC | Age: 60
End: 2020-07-27

## 2020-07-27 VITALS
SYSTOLIC BLOOD PRESSURE: 126 MMHG | HEART RATE: 89 BPM | DIASTOLIC BLOOD PRESSURE: 79 MMHG | WEIGHT: 203 LBS | HEIGHT: 69 IN | BODY MASS INDEX: 30.07 KG/M2

## 2020-07-27 DIAGNOSIS — R53.83 OTHER FATIGUE: ICD-10-CM

## 2020-07-27 DIAGNOSIS — Z72.0 TOBACCO USE: ICD-10-CM

## 2020-07-27 DIAGNOSIS — R94.31 ECG ABNORMAL: ICD-10-CM

## 2020-07-27 DIAGNOSIS — R94.31 ABNORMAL EKG: Primary | ICD-10-CM

## 2020-07-27 DIAGNOSIS — R07.2 PRECORDIAL PAIN: ICD-10-CM

## 2020-07-27 DIAGNOSIS — R00.2 PALPITATIONS: ICD-10-CM

## 2020-07-27 DIAGNOSIS — E78.5 HYPERLIPIDEMIA, UNSPECIFIED HYPERLIPIDEMIA TYPE: ICD-10-CM

## 2020-07-27 PROCEDURE — 99203 OFFICE O/P NEW LOW 30 MIN: CPT | Performed by: INTERNAL MEDICINE

## 2020-07-27 PROCEDURE — 93000 ELECTROCARDIOGRAM COMPLETE: CPT | Performed by: INTERNAL MEDICINE

## 2020-07-28 ENCOUNTER — HOSPITAL ENCOUNTER (OUTPATIENT)
Dept: CARDIOLOGY | Facility: HOSPITAL | Age: 60
Discharge: HOME | End: 2020-07-28
Admitting: INTERNAL MEDICINE

## 2020-07-28 VITALS
HEART RATE: 80 BPM | WEIGHT: 203.04 LBS | BODY MASS INDEX: 30.07 KG/M2 | HEIGHT: 69 IN | DIASTOLIC BLOOD PRESSURE: 87 MMHG | SYSTOLIC BLOOD PRESSURE: 137 MMHG

## 2020-07-28 LAB
AORTIC DIMENSIONLESS INDEX: 0.6 (DI)
BH CV ECHO MEAS - ACS: 2.1 CM
BH CV ECHO MEAS - AO MAX PG (FULL): 5.7 MMHG
BH CV ECHO MEAS - AO MAX PG: 8.1 MMHG
BH CV ECHO MEAS - AO MEAN PG (FULL): 3 MMHG
BH CV ECHO MEAS - AO MEAN PG: 4.1 MMHG
BH CV ECHO MEAS - AO ROOT AREA (BSA CORRECTED): 1.3
BH CV ECHO MEAS - AO ROOT AREA: 6.2 CM^2
BH CV ECHO MEAS - AO ROOT DIAM: 2.8 CM
BH CV ECHO MEAS - AO V2 MAX: 142.5 CM/SEC
BH CV ECHO MEAS - AO V2 MEAN: 93.9 CM/SEC
BH CV ECHO MEAS - AO V2 VTI: 27.8 CM
BH CV ECHO MEAS - AVA(I,A): 2.1 CM^2
BH CV ECHO MEAS - AVA(I,D): 2.1 CM^2
BH CV ECHO MEAS - AVA(V,A): 2.1 CM^2
BH CV ECHO MEAS - AVA(V,D): 2.1 CM^2
BH CV ECHO MEAS - BSA(HAYCOCK): 2.1 M^2
BH CV ECHO MEAS - BSA: 2.1 M^2
BH CV ECHO MEAS - BZI_BMI: 30 KILOGRAMS/M^2
BH CV ECHO MEAS - BZI_METRIC_HEIGHT: 175.3 CM
BH CV ECHO MEAS - BZI_METRIC_WEIGHT: 92.1 KG
BH CV ECHO MEAS - EDV(CUBED): 81.6 ML
BH CV ECHO MEAS - EDV(MOD-SP4): 86 ML
BH CV ECHO MEAS - EDV(TEICH): 84.7 ML
BH CV ECHO MEAS - EF(CUBED): 75.7 %
BH CV ECHO MEAS - EF(MOD-BP): 69 %
BH CV ECHO MEAS - EF(MOD-SP4): 68.6 %
BH CV ECHO MEAS - EF(TEICH): 68 %
BH CV ECHO MEAS - ESV(CUBED): 19.8 ML
BH CV ECHO MEAS - ESV(MOD-SP4): 27 ML
BH CV ECHO MEAS - ESV(TEICH): 27.1 ML
BH CV ECHO MEAS - FS: 37.6 %
BH CV ECHO MEAS - IVS/LVPW: 0.94
BH CV ECHO MEAS - IVSD: 1 CM
BH CV ECHO MEAS - LAT PEAK E' VEL: 10 CM/SEC
BH CV ECHO MEAS - LV DIASTOLIC VOL/BSA (35-75): 41.4 ML/M^2
BH CV ECHO MEAS - LV MASS(C)D: 157.5 GRAMS
BH CV ECHO MEAS - LV MASS(C)DI: 75.8 GRAMS/M^2
BH CV ECHO MEAS - LV MAX PG: 2.4 MMHG
BH CV ECHO MEAS - LV MEAN PG: 1.1 MMHG
BH CV ECHO MEAS - LV SYSTOLIC VOL/BSA (12-30): 13 ML/M^2
BH CV ECHO MEAS - LV V1 MAX: 77.6 CM/SEC
BH CV ECHO MEAS - LV V1 MEAN: 47.9 CM/SEC
BH CV ECHO MEAS - LV V1 VTI: 15.3 CM
BH CV ECHO MEAS - LVIDD: 4.3 CM
BH CV ECHO MEAS - LVIDS: 2.7 CM
BH CV ECHO MEAS - LVLD AP4: 7.7 CM
BH CV ECHO MEAS - LVLS AP4: 5.9 CM
BH CV ECHO MEAS - LVOT AREA (M): 3.8 CM^2
BH CV ECHO MEAS - LVOT AREA: 3.9 CM^2
BH CV ECHO MEAS - LVOT DIAM: 2.2 CM
BH CV ECHO MEAS - LVPWD: 1.1 CM
BH CV ECHO MEAS - MED PEAK E' VEL: 7.1 CM/SEC
BH CV ECHO MEAS - MV A DUR: 0.11 SEC
BH CV ECHO MEAS - MV A MAX VEL: 88.8 CM/SEC
BH CV ECHO MEAS - MV DEC SLOPE: 326 CM/SEC^2
BH CV ECHO MEAS - MV DEC TIME: 190 SEC
BH CV ECHO MEAS - MV E MAX VEL: 59.7 CM/SEC
BH CV ECHO MEAS - MV E/A: 0.67
BH CV ECHO MEAS - MV MAX PG: 2.7 MMHG
BH CV ECHO MEAS - MV MEAN PG: 1 MMHG
BH CV ECHO MEAS - MV P1/2T MAX VEL: 80.1 CM/SEC
BH CV ECHO MEAS - MV P1/2T: 72 MSEC
BH CV ECHO MEAS - MV V2 MAX: 81.4 CM/SEC
BH CV ECHO MEAS - MV V2 MEAN: 47 CM/SEC
BH CV ECHO MEAS - MV V2 VTI: 19.9 CM
BH CV ECHO MEAS - MVA P1/2T LCG: 2.7 CM^2
BH CV ECHO MEAS - MVA(P1/2T): 3.1 CM^2
BH CV ECHO MEAS - MVA(VTI): 3 CM^2
BH CV ECHO MEAS - PA ACC TIME: 0.05 SEC
BH CV ECHO MEAS - PA MAX PG (FULL): 1.4 MMHG
BH CV ECHO MEAS - PA MAX PG: 4.6 MMHG
BH CV ECHO MEAS - PA PR(ACCEL): 57.6 MMHG
BH CV ECHO MEAS - PA V2 MAX: 107.2 CM/SEC
BH CV ECHO MEAS - PULM A REVS DUR: 0.11 SEC
BH CV ECHO MEAS - PULM A REVS VEL: 44 CM/SEC
BH CV ECHO MEAS - PULM DIAS VEL: 51.4 CM/SEC
BH CV ECHO MEAS - PULM S/D: 1.3
BH CV ECHO MEAS - PULM SYS VEL: 66.7 CM/SEC
BH CV ECHO MEAS - RAP SYSTOLE: 3 MMHG
BH CV ECHO MEAS - RV MAX PG: 3.2 MMHG
BH CV ECHO MEAS - RV MEAN PG: 1.5 MMHG
BH CV ECHO MEAS - RV V1 MAX: 89.2 CM/SEC
BH CV ECHO MEAS - RV V1 MEAN: 54.5 CM/SEC
BH CV ECHO MEAS - RV V1 VTI: 15 CM
BH CV ECHO MEAS - RVSP: 26 MMHG
BH CV ECHO MEAS - SI(AO): 82.5 ML/M^2
BH CV ECHO MEAS - SI(CUBED): 29.7 ML/M^2
BH CV ECHO MEAS - SI(LVOT): 28.5 ML/M^2
BH CV ECHO MEAS - SI(MOD-SP4): 28.4 ML/M^2
BH CV ECHO MEAS - SI(TEICH): 27.7 ML/M^2
BH CV ECHO MEAS - SV(AO): 171.5 ML
BH CV ECHO MEAS - SV(CUBED): 61.8 ML
BH CV ECHO MEAS - SV(LVOT): 59.3 ML
BH CV ECHO MEAS - SV(MOD-SP4): 59 ML
BH CV ECHO MEAS - SV(TEICH): 57.6 ML
BH CV ECHO MEAS - TR MAX PG: 23 MMHG
BH CV ECHO MEAS - TR MAX VEL: 238.9 CM/SEC
BH CV ECHO MEASUREMENTS AVERAGE E/E' RATIO: 6.98
BH CV XLRA - RV BASE: 3.4 CM
BH CV XLRA - RV LENGTH: 5 CM
BH CV XLRA - RV MID: 3 CM
BH CV XLRA - TDI S': 11.3 CM/SEC
LEFT ATRIUM VOLUME INDEX: 18.5 ML/M2
MAXIMAL PREDICTED HEART RATE: 163 BPM
STRESS TARGET HR: 139 BPM

## 2020-07-28 PROCEDURE — 93306 TTE W/DOPPLER COMPLETE: CPT

## 2020-07-28 PROCEDURE — 93306 TTE W/DOPPLER COMPLETE: CPT | Performed by: INTERNAL MEDICINE

## 2020-08-10 ENCOUNTER — HOSPITAL ENCOUNTER (OUTPATIENT)
Dept: CARDIOLOGY | Facility: HOSPITAL | Age: 60
Discharge: HOME | End: 2020-08-10

## 2020-08-10 VITALS
HEIGHT: 69 IN | RESPIRATION RATE: 16 BRPM | DIASTOLIC BLOOD PRESSURE: 65 MMHG | OXYGEN SATURATION: 99 % | HEART RATE: 84 BPM | SYSTOLIC BLOOD PRESSURE: 118 MMHG | BODY MASS INDEX: 29.62 KG/M2 | WEIGHT: 200 LBS

## 2020-08-10 LAB
BH CV STRESS BP STAGE 1: NORMAL
BH CV STRESS BP STAGE 2: NORMAL
BH CV STRESS BP STAGE 3: NORMAL
BH CV STRESS DURATION MIN STAGE 1: 3
BH CV STRESS DURATION MIN STAGE 2: 2
BH CV STRESS DURATION MIN STAGE 3: 1
BH CV STRESS DURATION SEC STAGE 1: 0
BH CV STRESS DURATION SEC STAGE 2: 0
BH CV STRESS DURATION SEC STAGE 3: 31
BH CV STRESS GRADE STAGE 1: 10
BH CV STRESS GRADE STAGE 2: 12
BH CV STRESS GRADE STAGE 3: 14
BH CV STRESS HR STAGE 1: 120
BH CV STRESS HR STAGE 2: 134
BH CV STRESS HR STAGE 3: 142
BH CV STRESS METS STAGE 1: 4.6
BH CV STRESS METS STAGE 2: 7
BH CV STRESS METS STAGE 3: 9
BH CV STRESS PROTOCOL 1: NORMAL
BH CV STRESS RECOVERY BP: NORMAL MMHG
BH CV STRESS RECOVERY HR: 106 BPM
BH CV STRESS RECOVERY O2: 99 %
BH CV STRESS SPEED STAGE 1: 1.7
BH CV STRESS SPEED STAGE 2: 2.5
BH CV STRESS SPEED STAGE 3: 3
BH CV STRESS STAGE 1: 1
BH CV STRESS STAGE 2: 2
BH CV STRESS STAGE 3: 3
LV EF NUC BP: 70 %
MAXIMAL PREDICTED HEART RATE: 163 BPM
PERCENT MAX PREDICTED HR: 87.12 %
STRESS BASELINE BP: NORMAL MMHG
STRESS BASELINE HR: 97 BPM
STRESS O2 SAT REST: 99 %
STRESS PERCENT HR: 102 %
STRESS POST ESTIMATED WORKLOAD: 9 METS
STRESS POST EXERCISE DUR MIN: 6 MIN
STRESS POST EXERCISE DUR SEC: 30 SEC
STRESS POST PEAK BP: NORMAL MMHG
STRESS POST PEAK HR: 142 BPM
STRESS TARGET HR: 139 BPM

## 2020-08-10 PROCEDURE — 0 TECHNETIUM SESTAMIBI: Performed by: INTERNAL MEDICINE

## 2020-08-10 PROCEDURE — 93016 CV STRESS TEST SUPVJ ONLY: CPT | Performed by: INTERNAL MEDICINE

## 2020-08-10 PROCEDURE — 93017 CV STRESS TEST TRACING ONLY: CPT

## 2020-08-10 PROCEDURE — 78452 HT MUSCLE IMAGE SPECT MULT: CPT | Performed by: INTERNAL MEDICINE

## 2020-08-10 PROCEDURE — 93018 CV STRESS TEST I&R ONLY: CPT | Performed by: INTERNAL MEDICINE

## 2020-08-10 PROCEDURE — A9500 TC99M SESTAMIBI: HCPCS | Performed by: INTERNAL MEDICINE

## 2020-08-10 PROCEDURE — 78452 HT MUSCLE IMAGE SPECT MULT: CPT

## 2020-08-10 RX ADMIN — TECHNETIUM TC 99M SESTAMIBI 1 DOSE: 1 INJECTION INTRAVENOUS at 12:25

## 2020-08-10 RX ADMIN — TECHNETIUM TC 99M SESTAMIBI 1 DOSE: 1 INJECTION INTRAVENOUS at 08:48

## 2020-08-17 ENCOUNTER — OFFICE VISIT (OUTPATIENT)
Dept: CARDIOLOGY | Facility: CLINIC | Age: 60
End: 2020-08-17

## 2020-08-17 VITALS — BODY MASS INDEX: 29.62 KG/M2 | HEIGHT: 69 IN | WEIGHT: 200 LBS

## 2020-08-17 DIAGNOSIS — R00.2 PALPITATIONS: ICD-10-CM

## 2020-08-17 DIAGNOSIS — E78.5 HYPERLIPIDEMIA, UNSPECIFIED HYPERLIPIDEMIA TYPE: ICD-10-CM

## 2020-08-17 DIAGNOSIS — R94.31 ABNORMAL EKG: Primary | ICD-10-CM

## 2020-08-17 PROCEDURE — 99441 PR PHYS/QHP TELEPHONE EVALUATION 5-10 MIN: CPT | Performed by: INTERNAL MEDICINE

## 2020-08-17 NOTE — PROGRESS NOTES
You have chosen to receive care through a telephone visit. Do you consent to use a telephone visit for your medical care today? Yes    Results   Subjective:        Ken Galo is a 59 y.o. female who here for follow up    CC  Telephone results for palpitation  HPI  59-year-old female with abnormal EKG palpitation and hyperlipidemia on telephone conference has recently undergone a stress test as well as echocardiogram     Problem List Items Addressed This Visit        Cardiovascular and Mediastinum    Abnormal EKG - Primary    Palpitations    Hyperlipidemia        .    The following portions of the patient's history were reviewed and updated as appropriate: allergies, current medications, past family history, past medical history, past social history, past surgical history and problem list.    Past Medical History:   Diagnosis Date   • Arthritis    • Diabetes mellitus (CMS/Formerly [facility])    • GERD (gastroesophageal reflux disease)    • Hyperlipidemia    • Hypertension    • RA (rheumatoid arthritis) (CMS/Formerly [facility])    • Sleep apnea     NO CPAP     reports that she quit smoking about 13 months ago. Her smoking use included cigarettes. She quit after 30.00 years of use. She has never used smokeless tobacco. She reports that she does not drink alcohol or use drugs.   Family History   Problem Relation Age of Onset   • Hypertension Mother    • Hyperlipidemia Mother    • Diabetes Mother    • Hypertension Father    • Hyperlipidemia Father    • Diabetes Father    • Hypertension Maternal Grandmother    • Hypertension Maternal Grandfather    • Hypertension Paternal Grandmother    • Hypertension Paternal Grandfather    • Malig Hyperthermia Neg Hx        Review of Systems  Constitutional: No wt loss, fever, fatigue  Gastrointestinal: No nausea, abdominal pain  Behavioral/Psych: No insomnia or anxiety   Cardiovascular *occasional palpitation  Objective:       Physical Exam  Telephone conference only    Procedures      Echocardiogram:         Current Outpatient Medications:   •  BENAZEPRIL HCL PO, Take 40 mg by mouth Daily. Unsure of dosage , Disp: , Rfl:   •  cetirizine (zyrTEC) 10 MG tablet, Take 10 mg by mouth Daily., Disp: , Rfl:   •  Cholecalciferol (VITAMIN D3) 5000 units capsule capsule, Take 5,000 Units by mouth Daily., Disp: , Rfl:   •  esomeprazole (nexIUM) 40 MG capsule, Take 40 mg by mouth Every Morning Before Breakfast., Disp: , Rfl:   •  fluticasone (FLONASE) 50 MCG/ACT nasal spray, 2 sprays into the nostril(s) as directed by provider Daily., Disp: , Rfl:   •  HYDROcodone-acetaminophen (NORCO)  MG per tablet, Take 1 tablet by mouth Every 6 (Six) Hours As Needed for Moderate Pain ., Disp: , Rfl:   •  Multiple Vitamins-Minerals (MULTIVITAMIN ADULT PO), Take 1 tablet by mouth Daily., Disp: , Rfl:   •  pravastatin (PRAVACHOL) 10 MG tablet, Take 10 mg by mouth Daily., Disp: , Rfl:   •  SITagliptin-metFORMIN HCl (JANUMET PO), Take 1 tablet by mouth Daily., Disp: , Rfl:    Assessment:        Patient Active Problem List   Diagnosis   • Status post total knee replacement, right   • Abnormal EKG   • Palpitations   • Precordial pain   • Other fatigue   • Tobacco use   • Hyperlipidemia     Interpretation Summary        · Findings consistent with an equivocal ECG stress test.  · Left ventricular ejection fraction is normal (Calculated EF = 70%).  · Myocardial perfusion imaging indicates a normal myocardial perfusion study with no evidence of ischemia.  · Impressions are consistent with a low risk study.     Interpretation Summary     · Left ventricular diastolic dysfunction (grade I) consistent with impaired relaxation.  · Calculated EF = 69.0%  · There is no evidence of pericardial effusion.                  Plan:            ICD-10-CM ICD-9-CM   1. Abnormal EKG R94.31 794.31   2. Hyperlipidemia, unspecified hyperlipidemia type E78.5 272.4   3. Palpitations R00.2 785.1     1. Abnormal EKG  The work-up has been negative    2. Hyperlipidemia,  unspecified hyperlipidemia type  Continue current treatment    3. Palpitations  Under control       See in 1 yr    Specificity and sensitivity of the stress test/ cardiac workup has been explained. Pt has been explained if  Symptoms continue please go to ER, and further w/p will be required.    Also explained this does not rule out coronary artery disease or the future events, continue to emphasize on risk reductions for coronary artery disease    Pt also advised to contact PCP for other causes of symptoms      This patient has consented to a telehealth visit via telephone. The visit was scheduled as a telephone visit to comply with patient safety concerns in accordance with CDC recommendations.  All vitals recorded within this visit are reported by the patient.  I spent 8 minutes in total including but not limited to the 8 minutes spent in direct conversation with this patient.     COUNSELING:    Ken Landeros was given to patient for the following topics: diagnostic results, risk factor reductions, impressions, risks and benefits of treatment options and importance of treatment compliance .       SMOKING COUNSELING:    [unfilled]    Dictated using Dragon dictation

## 2020-08-18 VITALS — WEIGHT: 200 LBS | HEIGHT: 69 IN

## 2020-08-21 ENCOUNTER — TELEHEALTH PROVIDED OTHER THAN IN PATIENT'S HOME (AMBULATORY)
Dept: URBAN - METROPOLITAN AREA TELEHEALTH 6 | Facility: TELEHEALTH | Age: 60
End: 2020-08-21

## 2020-08-21 DIAGNOSIS — K58.0 IRRITABLE BOWEL SYNDROME WITH DIARRHEA: ICD-10-CM

## 2020-08-21 DIAGNOSIS — R19.4 CHANGE IN BOWEL HABIT: ICD-10-CM

## 2020-08-21 DIAGNOSIS — R19.7 DIARRHEA, UNSPECIFIED: ICD-10-CM

## 2020-08-21 PROCEDURE — 99212 OFFICE O/P EST SF 10 MIN: CPT | Mod: 95 | Performed by: INTERNAL MEDICINE

## 2021-05-16 VITALS — HEIGHT: 68 IN | RESPIRATION RATE: 16 BRPM | WEIGHT: 220 LBS | BODY MASS INDEX: 33.34 KG/M2

## 2022-01-03 ENCOUNTER — APPOINTMENT (OUTPATIENT)
Dept: CT IMAGING | Facility: HOSPITAL | Age: 62
End: 2022-01-03

## 2022-01-03 ENCOUNTER — HOSPITAL ENCOUNTER (OUTPATIENT)
Facility: HOSPITAL | Age: 62
Setting detail: OBSERVATION
Discharge: HOME | End: 2022-01-04
Attending: EMERGENCY MEDICINE | Admitting: HOSPITALIST

## 2022-01-03 DIAGNOSIS — K56.600 PARTIAL INTESTINAL OBSTRUCTION, UNSPECIFIED CAUSE: ICD-10-CM

## 2022-01-03 DIAGNOSIS — R10.84 GENERALIZED ABDOMINAL PAIN: Primary | ICD-10-CM

## 2022-01-03 PROBLEM — E11.9 TYPE 2 DIABETES MELLITUS, WITHOUT LONG-TERM CURRENT USE OF INSULIN: Status: ACTIVE | Noted: 2022-01-03

## 2022-01-03 PROBLEM — I10 PRIMARY HYPERTENSION: Status: ACTIVE | Noted: 2022-01-03

## 2022-01-03 LAB
ALBUMIN SERPL-MCNC: 4.5 G/DL (ref 3.5–5.2)
ALBUMIN/GLOB SERPL: 1.6 G/DL
ALP SERPL-CCNC: 122 U/L (ref 39–117)
ALT SERPL W P-5'-P-CCNC: 17 U/L (ref 1–33)
ANION GAP SERPL CALCULATED.3IONS-SCNC: 9.9 MMOL/L (ref 5–15)
AST SERPL-CCNC: 11 U/L (ref 1–32)
BACTERIA UR QL AUTO: ABNORMAL /HPF
BASOPHILS # BLD AUTO: 0.03 10*3/MM3 (ref 0–0.2)
BASOPHILS NFR BLD AUTO: 0.2 % (ref 0–1.5)
BILIRUB SERPL-MCNC: 0.2 MG/DL (ref 0–1.2)
BILIRUB UR QL STRIP: NEGATIVE
BUN SERPL-MCNC: 12 MG/DL (ref 6–20)
BUN/CREAT SERPL: 13.3 (ref 7–25)
CALCIUM SPEC-SCNC: 9.7 MG/DL (ref 8.6–10.5)
CHLORIDE SERPL-SCNC: 101 MMOL/L (ref 98–107)
CLARITY UR: CLEAR
CO2 SERPL-SCNC: 29.1 MMOL/L (ref 22–29)
COLOR UR: YELLOW
CREAT SERPL-MCNC: 0.9 MG/DL (ref 0.57–1)
DEPRECATED RDW RBC AUTO: 41.4 FL (ref 37–54)
EOSINOPHIL # BLD AUTO: 0.1 10*3/MM3 (ref 0–0.4)
EOSINOPHIL NFR BLD AUTO: 0.8 % (ref 0.3–6.2)
ERYTHROCYTE [DISTWIDTH] IN BLOOD BY AUTOMATED COUNT: 14.2 % (ref 12.3–15.4)
GFR SERPL CREATININE-BSD FRML MDRD: 78 ML/MIN/1.73
GLOBULIN UR ELPH-MCNC: 2.8 GM/DL
GLUCOSE BLDC GLUCOMTR-MCNC: 104 MG/DL (ref 70–130)
GLUCOSE BLDC GLUCOMTR-MCNC: 83 MG/DL (ref 70–130)
GLUCOSE SERPL-MCNC: 163 MG/DL (ref 65–99)
GLUCOSE UR STRIP-MCNC: NEGATIVE MG/DL
HCT VFR BLD AUTO: 43.1 % (ref 34–46.6)
HGB BLD-MCNC: 14.2 G/DL (ref 12–15.9)
HGB UR QL STRIP.AUTO: NEGATIVE
HOLD SPECIMEN: NORMAL
HOLD SPECIMEN: NORMAL
HYALINE CASTS UR QL AUTO: ABNORMAL /LPF
IMM GRANULOCYTES # BLD AUTO: 0.04 10*3/MM3 (ref 0–0.05)
IMM GRANULOCYTES NFR BLD AUTO: 0.3 % (ref 0–0.5)
KETONES UR QL STRIP: ABNORMAL
LEUKOCYTE ESTERASE UR QL STRIP.AUTO: ABNORMAL
LIPASE SERPL-CCNC: 41 U/L (ref 13–60)
LYMPHOCYTES # BLD AUTO: 2.16 10*3/MM3 (ref 0.7–3.1)
LYMPHOCYTES NFR BLD AUTO: 17.2 % (ref 19.6–45.3)
MCH RBC QN AUTO: 26.7 PG (ref 26.6–33)
MCHC RBC AUTO-ENTMCNC: 32.9 G/DL (ref 31.5–35.7)
MCV RBC AUTO: 81 FL (ref 79–97)
MONOCYTES # BLD AUTO: 0.62 10*3/MM3 (ref 0.1–0.9)
MONOCYTES NFR BLD AUTO: 4.9 % (ref 5–12)
NEUTROPHILS NFR BLD AUTO: 76.6 % (ref 42.7–76)
NEUTROPHILS NFR BLD AUTO: 9.64 10*3/MM3 (ref 1.7–7)
NITRITE UR QL STRIP: NEGATIVE
NRBC BLD AUTO-RTO: 0 /100 WBC (ref 0–0.2)
PH UR STRIP.AUTO: <=5 [PH] (ref 5–8)
PLATELET # BLD AUTO: 310 10*3/MM3 (ref 140–450)
PMV BLD AUTO: 10.4 FL (ref 6–12)
POTASSIUM SERPL-SCNC: 3.8 MMOL/L (ref 3.5–5.2)
PROT SERPL-MCNC: 7.3 G/DL (ref 6–8.5)
PROT UR QL STRIP: ABNORMAL
QT INTERVAL: 371 MS
RBC # BLD AUTO: 5.32 10*6/MM3 (ref 3.77–5.28)
RBC # UR STRIP: ABNORMAL /HPF
REF LAB TEST METHOD: ABNORMAL
SARS-COV-2 RNA RESP QL NAA+PROBE: NOT DETECTED
SODIUM SERPL-SCNC: 140 MMOL/L (ref 136–145)
SP GR UR STRIP: >=1.03 (ref 1–1.03)
SQUAMOUS #/AREA URNS HPF: ABNORMAL /HPF
UROBILINOGEN UR QL STRIP: ABNORMAL
WBC # UR STRIP: ABNORMAL /HPF
WBC NRBC COR # BLD: 12.59 10*3/MM3 (ref 3.4–10.8)
WHOLE BLOOD HOLD SPECIMEN: NORMAL
WHOLE BLOOD HOLD SPECIMEN: NORMAL

## 2022-01-03 PROCEDURE — 25010000002 ONDANSETRON PER 1 MG: Performed by: EMERGENCY MEDICINE

## 2022-01-03 PROCEDURE — 83690 ASSAY OF LIPASE: CPT

## 2022-01-03 PROCEDURE — 96374 THER/PROPH/DIAG INJ IV PUSH: CPT

## 2022-01-03 PROCEDURE — 80053 COMPREHEN METABOLIC PANEL: CPT

## 2022-01-03 PROCEDURE — G0378 HOSPITAL OBSERVATION PER HR: HCPCS

## 2022-01-03 PROCEDURE — 36415 COLL VENOUS BLD VENIPUNCTURE: CPT

## 2022-01-03 PROCEDURE — 74177 CT ABD & PELVIS W/CONTRAST: CPT

## 2022-01-03 PROCEDURE — 82962 GLUCOSE BLOOD TEST: CPT

## 2022-01-03 PROCEDURE — C9803 HOPD COVID-19 SPEC COLLECT: HCPCS | Performed by: EMERGENCY MEDICINE

## 2022-01-03 PROCEDURE — 93005 ELECTROCARDIOGRAM TRACING: CPT

## 2022-01-03 PROCEDURE — 25010000002 MORPHINE PER 10 MG: Performed by: HOSPITALIST

## 2022-01-03 PROCEDURE — 99284 EMERGENCY DEPT VISIT MOD MDM: CPT

## 2022-01-03 PROCEDURE — 96375 TX/PRO/DX INJ NEW DRUG ADDON: CPT

## 2022-01-03 PROCEDURE — 93010 ELECTROCARDIOGRAM REPORT: CPT | Performed by: INTERNAL MEDICINE

## 2022-01-03 PROCEDURE — 25010000002 HYDROMORPHONE PER 4 MG: Performed by: EMERGENCY MEDICINE

## 2022-01-03 PROCEDURE — 93005 ELECTROCARDIOGRAM TRACING: CPT | Performed by: EMERGENCY MEDICINE

## 2022-01-03 PROCEDURE — 25010000002 IOPAMIDOL 61 % SOLUTION: Performed by: EMERGENCY MEDICINE

## 2022-01-03 PROCEDURE — 81001 URINALYSIS AUTO W/SCOPE: CPT

## 2022-01-03 PROCEDURE — 85025 COMPLETE CBC W/AUTO DIFF WBC: CPT

## 2022-01-03 PROCEDURE — U0003 INFECTIOUS AGENT DETECTION BY NUCLEIC ACID (DNA OR RNA); SEVERE ACUTE RESPIRATORY SYNDROME CORONAVIRUS 2 (SARS-COV-2) (CORONAVIRUS DISEASE [COVID-19]), AMPLIFIED PROBE TECHNIQUE, MAKING USE OF HIGH THROUGHPUT TECHNOLOGIES AS DESCRIBED BY CMS-2020-01-R: HCPCS | Performed by: EMERGENCY MEDICINE

## 2022-01-03 RX ORDER — CETIRIZINE HYDROCHLORIDE 10 MG/1
10 TABLET ORAL DAILY
Status: DISCONTINUED | OUTPATIENT
Start: 2022-01-03 | End: 2022-01-04 | Stop reason: HOSPADM

## 2022-01-03 RX ORDER — INSULIN LISPRO 100 [IU]/ML
0-9 INJECTION, SOLUTION INTRAVENOUS; SUBCUTANEOUS
Status: DISCONTINUED | OUTPATIENT
Start: 2022-01-03 | End: 2022-01-04 | Stop reason: HOSPADM

## 2022-01-03 RX ORDER — HYDROMORPHONE HYDROCHLORIDE 1 MG/ML
0.5 INJECTION, SOLUTION INTRAMUSCULAR; INTRAVENOUS; SUBCUTANEOUS ONCE
Status: COMPLETED | OUTPATIENT
Start: 2022-01-03 | End: 2022-01-03

## 2022-01-03 RX ORDER — MORPHINE SULFATE 2 MG/ML
2 INJECTION, SOLUTION INTRAMUSCULAR; INTRAVENOUS
Status: DISCONTINUED | OUTPATIENT
Start: 2022-01-03 | End: 2022-01-04 | Stop reason: HOSPADM

## 2022-01-03 RX ORDER — ONDANSETRON 2 MG/ML
4 INJECTION INTRAMUSCULAR; INTRAVENOUS EVERY 6 HOURS PRN
Status: DISCONTINUED | OUTPATIENT
Start: 2022-01-03 | End: 2022-01-04 | Stop reason: HOSPADM

## 2022-01-03 RX ORDER — SODIUM CHLORIDE 0.9 % (FLUSH) 0.9 %
10 SYRINGE (ML) INJECTION AS NEEDED
Status: DISCONTINUED | OUTPATIENT
Start: 2022-01-03 | End: 2022-01-03

## 2022-01-03 RX ORDER — SODIUM CHLORIDE 9 MG/ML
125 INJECTION, SOLUTION INTRAVENOUS CONTINUOUS
Status: DISCONTINUED | OUTPATIENT
Start: 2022-01-03 | End: 2022-01-03

## 2022-01-03 RX ORDER — ACETAMINOPHEN 160 MG/5ML
650 SOLUTION ORAL EVERY 4 HOURS PRN
Status: DISCONTINUED | OUTPATIENT
Start: 2022-01-03 | End: 2022-01-04 | Stop reason: HOSPADM

## 2022-01-03 RX ORDER — DEXTROSE MONOHYDRATE 25 G/50ML
25 INJECTION, SOLUTION INTRAVENOUS
Status: DISCONTINUED | OUTPATIENT
Start: 2022-01-03 | End: 2022-01-04 | Stop reason: HOSPADM

## 2022-01-03 RX ORDER — FLUTICASONE PROPIONATE 50 MCG
2 SPRAY, SUSPENSION (ML) NASAL DAILY
Status: DISCONTINUED | OUTPATIENT
Start: 2022-01-03 | End: 2022-01-04 | Stop reason: HOSPADM

## 2022-01-03 RX ORDER — ONDANSETRON 2 MG/ML
4 INJECTION INTRAMUSCULAR; INTRAVENOUS ONCE
Status: COMPLETED | OUTPATIENT
Start: 2022-01-03 | End: 2022-01-03

## 2022-01-03 RX ORDER — NICOTINE POLACRILEX 4 MG
15 LOZENGE BUCCAL
Status: DISCONTINUED | OUTPATIENT
Start: 2022-01-03 | End: 2022-01-04 | Stop reason: HOSPADM

## 2022-01-03 RX ORDER — SODIUM CHLORIDE 0.9 % (FLUSH) 0.9 %
10 SYRINGE (ML) INJECTION EVERY 12 HOURS SCHEDULED
Status: DISCONTINUED | OUTPATIENT
Start: 2022-01-03 | End: 2022-01-04 | Stop reason: HOSPADM

## 2022-01-03 RX ORDER — SODIUM CHLORIDE AND POTASSIUM CHLORIDE 150; 900 MG/100ML; MG/100ML
100 INJECTION, SOLUTION INTRAVENOUS CONTINUOUS
Status: DISCONTINUED | OUTPATIENT
Start: 2022-01-03 | End: 2022-01-04

## 2022-01-03 RX ORDER — NALOXONE HCL 0.4 MG/ML
0.4 VIAL (ML) INJECTION
Status: DISCONTINUED | OUTPATIENT
Start: 2022-01-03 | End: 2022-01-04 | Stop reason: HOSPADM

## 2022-01-03 RX ORDER — ACETAMINOPHEN 650 MG/1
650 SUPPOSITORY RECTAL EVERY 4 HOURS PRN
Status: DISCONTINUED | OUTPATIENT
Start: 2022-01-03 | End: 2022-01-04 | Stop reason: HOSPADM

## 2022-01-03 RX ORDER — ACETAMINOPHEN 325 MG/1
650 TABLET ORAL EVERY 4 HOURS PRN
Status: DISCONTINUED | OUTPATIENT
Start: 2022-01-03 | End: 2022-01-04 | Stop reason: HOSPADM

## 2022-01-03 RX ORDER — ONDANSETRON 4 MG/1
4 TABLET, FILM COATED ORAL EVERY 6 HOURS PRN
Status: DISCONTINUED | OUTPATIENT
Start: 2022-01-03 | End: 2022-01-04 | Stop reason: HOSPADM

## 2022-01-03 RX ORDER — SODIUM CHLORIDE 0.9 % (FLUSH) 0.9 %
10 SYRINGE (ML) INJECTION AS NEEDED
Status: DISCONTINUED | OUTPATIENT
Start: 2022-01-03 | End: 2022-01-04 | Stop reason: HOSPADM

## 2022-01-03 RX ORDER — PANTOPRAZOLE SODIUM 40 MG/10ML
40 INJECTION, POWDER, LYOPHILIZED, FOR SOLUTION INTRAVENOUS
Status: DISCONTINUED | OUTPATIENT
Start: 2022-01-04 | End: 2022-01-04 | Stop reason: HOSPADM

## 2022-01-03 RX ADMIN — CETIRIZINE HYDROCHLORIDE 10 MG: 10 TABLET ORAL at 20:45

## 2022-01-03 RX ADMIN — IOPAMIDOL 85 ML: 612 INJECTION, SOLUTION INTRAVENOUS at 08:57

## 2022-01-03 RX ADMIN — SODIUM CHLORIDE 125 ML/HR: 9 INJECTION, SOLUTION INTRAVENOUS at 08:44

## 2022-01-03 RX ADMIN — SODIUM CHLORIDE, PRESERVATIVE FREE 10 ML: 5 INJECTION INTRAVENOUS at 20:46

## 2022-01-03 RX ADMIN — FLUTICASONE PROPIONATE 2 SPRAY: 50 SPRAY, METERED NASAL at 20:45

## 2022-01-03 RX ADMIN — HYDROMORPHONE HYDROCHLORIDE 0.5 MG: 1 INJECTION, SOLUTION INTRAMUSCULAR; INTRAVENOUS; SUBCUTANEOUS at 08:48

## 2022-01-03 RX ADMIN — MORPHINE SULFATE 2 MG: 2 INJECTION, SOLUTION INTRAMUSCULAR; INTRAVENOUS at 15:51

## 2022-01-03 RX ADMIN — ONDANSETRON 4 MG: 2 INJECTION INTRAMUSCULAR; INTRAVENOUS at 08:48

## 2022-01-03 NOTE — ED PROVIDER NOTES
EMERGENCY DEPARTMENT ENCOUNTER    Room Number:  13/13  Date of encounter:  1/3/2022  PCP: Rojas Galeas MD  Historian: Patient     I used full protective equipment while examining this patient.  This includes face mask, gloves and protective eyewear.  I washed my hands before entering the room and immediately upon leaving the room      HPI:  Chief Complaint: Abdominal pain  A complete HPI/ROS/PMH/PSH/SH/FH are unobtainable due to: None    Context: Ken Galo is a 61 y.o. female who presents to the ED c/o abdominal pain.  Pain began around midnight.  Pain is generalized and described as sharp.  Pain is severe at its worst and is worsened by nothing, improved by nothing.  Patient states she has not had similar pain in the past.  She does report nausea but no vomiting.  She has not had any bowel movement since yesterday.      MEDICAL RECORD REVIEW  I reviewed prior medical records which show that patient has a history of arthritis, diabetes and hypertension.  Abdominal surgeries include colon resection, cholecystectomy and hysterectomy.    PAST MEDICAL HISTORY  Active Ambulatory Problems     Diagnosis Date Noted   • Status post total knee replacement, right 07/16/2019   • Abnormal EKG 07/27/2020   • Palpitations 07/27/2020   • Precordial pain 07/27/2020   • Other fatigue 07/27/2020   • Tobacco use 07/27/2020   • Hyperlipidemia 07/27/2020     Resolved Ambulatory Problems     Diagnosis Date Noted   • No Resolved Ambulatory Problems     Past Medical History:   Diagnosis Date   • Arthritis    • Diabetes mellitus (HCC)    • GERD (gastroesophageal reflux disease)    • Hypertension    • RA (rheumatoid arthritis) (HCC)    • Sleep apnea          PAST SURGICAL HISTORY  Past Surgical History:   Procedure Laterality Date   • COLON RESECTION     • COLONOSCOPY     • HYSTERECTOMY     • LAPAROSCOPIC CHOLECYSTECTOMY     • LUNG LOBECTOMY      RT LL.   • TOTAL KNEE ARTHROPLASTY Right 7/16/2019    Procedure: TOTAL KNEE  ARTHROPLASTY;  Surgeon: Harshal Stokes MD;  Location: [facility] OR;  Service: Orthopedics         FAMILY HISTORY  Family History   Problem Relation Age of Onset   • Hypertension Mother    • Hyperlipidemia Mother    • Diabetes Mother    • Hypertension Father    • Hyperlipidemia Father    • Diabetes Father    • Hypertension Maternal Grandmother    • Hypertension Maternal Grandfather    • Hypertension Paternal Grandmother    • Hypertension Paternal Grandfather    • Malig Hyperthermia Neg Hx          SOCIAL HISTORY  Social History     Socioeconomic History   • Marital status:    Tobacco Use   • Smoking status: Former Smoker     Years: 30.00     Types: Cigarettes     Quit date: 2019     Years since quittin.4   • Smokeless tobacco: Never Used   • Tobacco comment: QUIT 8 DAYS AGO   Substance and Sexual Activity   • Alcohol use: No   • Drug use: No   • Sexual activity: Defer     Birth control/protection: Surgical         ALLERGIES  Clindamycin, Tree nut, Clindamycin/lincomycin, and Vinyl ether       REVIEW OF SYSTEMS  Review of Systems   Constitutional: Negative.  Negative for fever.   HENT: Negative.  Negative for sore throat.    Eyes: Negative.    Respiratory: Negative.  Negative for cough.    Cardiovascular: Negative.  Negative for chest pain.   Gastrointestinal: Positive for abdominal distention, abdominal pain and nausea.   Genitourinary: Negative.  Negative for dysuria.   Musculoskeletal: Negative.  Negative for back pain.   Skin: Negative.  Negative for rash.   Neurological: Negative.  Negative for headaches.   All other systems reviewed and are negative.          PHYSICAL EXAM    I have reviewed the triage vital signs and nursing notes.    ED Triage Vitals   Temp Heart Rate Resp BP SpO2   226 226 226 228 22   97.8 °F (36.6 °C) 111 20 (!) 151/104 98 %      Temp src Heart Rate Source Patient Position BP Location FiO2 (%)   22 0226 22  0226 01/03/22 0228 01/03/22 0228 --   Oral Monitor Standing Left arm        Physical Exam  GENERAL: Alert female in mild distress.  Triage vitals reviewed notable for elevated pulse of 111 and elevated blood pressure 151/104.  O2 saturations 98% on room air  HENT: nares patent  EYES: no scleral icterus  CV: regular rhythm, regular rate  RESPIRATORY: normal effort, clear to auscultation bilaterally  ABDOMEN: soft, moderate abdominal distention with moderate generalized tenderness to palpation.  MUSCULOSKELETAL: no deformity  NEURO: Strength sensation and coordination are grossly intact.  Speech and mentation are unremarkable  SKIN: warm, dry      LAB RESULTS  Recent Results (from the past 24 hour(s))   ECG 12 Lead    Collection Time: 01/03/22  3:02 AM   Result Value Ref Range    QT Interval 371 ms   Comprehensive Metabolic Panel    Collection Time: 01/03/22  3:29 AM    Specimen: Blood   Result Value Ref Range    Glucose 163 (H) 65 - 99 mg/dL    BUN 12 6 - 20 mg/dL    Creatinine 0.90 0.57 - 1.00 mg/dL    Sodium 140 136 - 145 mmol/L    Potassium 3.8 3.5 - 5.2 mmol/L    Chloride 101 98 - 107 mmol/L    CO2 29.1 (H) 22.0 - 29.0 mmol/L    Calcium 9.7 8.6 - 10.5 mg/dL    Total Protein 7.3 6.0 - 8.5 g/dL    Albumin 4.50 3.50 - 5.20 g/dL    ALT (SGPT) 17 1 - 33 U/L    AST (SGOT) 11 1 - 32 U/L    Alkaline Phosphatase 122 (H) 39 - 117 U/L    Total Bilirubin 0.2 0.0 - 1.2 mg/dL    eGFR  African Amer 78 >60 mL/min/1.73    Globulin 2.8 gm/dL    A/G Ratio 1.6 g/dL    BUN/Creatinine Ratio 13.3 7.0 - 25.0    Anion Gap 9.9 5.0 - 15.0 mmol/L   Lipase    Collection Time: 01/03/22  3:29 AM    Specimen: Blood   Result Value Ref Range    Lipase 41 13 - 60 U/L   Green Top (Gel)    Collection Time: 01/03/22  3:29 AM   Result Value Ref Range    Extra Tube Hold for add-ons.    Lavender Top    Collection Time: 01/03/22  3:29 AM   Result Value Ref Range    Extra Tube hold for add-on    Gold Top - SST    Collection Time: 01/03/22  3:29 AM    Result Value Ref Range    Extra Tube Hold for add-ons.    Light Blue Top    Collection Time: 01/03/22  3:29 AM   Result Value Ref Range    Extra Tube hold for add-on    CBC Auto Differential    Collection Time: 01/03/22  3:29 AM    Specimen: Blood   Result Value Ref Range    WBC 12.59 (H) 3.40 - 10.80 10*3/mm3    RBC 5.32 (H) 3.77 - 5.28 10*6/mm3    Hemoglobin 14.2 12.0 - 15.9 g/dL    Hematocrit 43.1 34.0 - 46.6 %    MCV 81.0 79.0 - 97.0 fL    MCH 26.7 26.6 - 33.0 pg    MCHC 32.9 31.5 - 35.7 g/dL    RDW 14.2 12.3 - 15.4 %    RDW-SD 41.4 37.0 - 54.0 fl    MPV 10.4 6.0 - 12.0 fL    Platelets 310 140 - 450 10*3/mm3    Neutrophil % 76.6 (H) 42.7 - 76.0 %    Lymphocyte % 17.2 (L) 19.6 - 45.3 %    Monocyte % 4.9 (L) 5.0 - 12.0 %    Eosinophil % 0.8 0.3 - 6.2 %    Basophil % 0.2 0.0 - 1.5 %    Immature Grans % 0.3 0.0 - 0.5 %    Neutrophils, Absolute 9.64 (H) 1.70 - 7.00 10*3/mm3    Lymphocytes, Absolute 2.16 0.70 - 3.10 10*3/mm3    Monocytes, Absolute 0.62 0.10 - 0.90 10*3/mm3    Eosinophils, Absolute 0.10 0.00 - 0.40 10*3/mm3    Basophils, Absolute 0.03 0.00 - 0.20 10*3/mm3    Immature Grans, Absolute 0.04 0.00 - 0.05 10*3/mm3    nRBC 0.0 0.0 - 0.2 /100 WBC   Urinalysis With Microscopic If Indicated (No Culture) - Urine, Clean Catch    Collection Time: 01/03/22  3:30 AM    Specimen: Urine, Clean Catch   Result Value Ref Range    Color, UA Yellow Yellow, Straw    Appearance, UA Clear Clear    pH, UA <=5.0 5.0 - 8.0    Specific Gravity, UA >=1.030 1.005 - 1.030    Glucose, UA Negative Negative    Ketones, UA Trace (A) Negative    Bilirubin, UA Negative Negative    Blood, UA Negative Negative    Protein, UA 30 mg/dL (1+) (A) Negative    Leuk Esterase, UA Trace (A) Negative    Nitrite, UA Negative Negative    Urobilinogen, UA 1.0 E.U./dL 0.2 - 1.0 E.U./dL   Urinalysis, Microscopic Only - Urine, Clean Catch    Collection Time: 01/03/22  3:30 AM    Specimen: Urine, Clean Catch   Result Value Ref Range    RBC, UA None Seen  None Seen, 0-2 /HPF    WBC, UA 3-5 (A) None Seen, 0-2 /HPF    Bacteria, UA 1+ (A) None Seen /HPF    Squamous Epithelial Cells, UA 3-6 (A) None Seen, 0-2 /HPF    Hyaline Casts, UA 7-12 None Seen /LPF    Methodology Manual Light Microscopy    COVID-19,[facility] IN-HOUSE CEPHEID/BRETT NP SWAB IN TRANSPORT MEDIA 8-12 HR TAT - Swab, Nasopharynx    Collection Time: 01/03/22  8:44 AM    Specimen: Nasopharynx; Swab   Result Value Ref Range    COVID19 Not Detected Not Detected - Ref. Range       Ordered the above labs and independently reviewed the results.      RADIOLOGY  CT Abdomen Pelvis With Contrast    Result Date: 1/3/2022  EXAMINATION: CT OF THE ABDOMEN AND PELVIS WITH CONTRAST  HISTORY: 61-year-old female with a history of abdominal pain, distention and back pain.  TECHNIQUE: Contiguous axial images were obtained through the abdomen and pelvis following intravenous administration of nonionic contrast. Oral contrast  was not administered. Radiation dose reduction techniques were utilized, including automated exposure control and exposure modulation based on body size.  COMPARISON: CT of abdomen and pelvis with contrast, 04/14/2003  FINDINGS: The visualized portion of the lung basesare clear of consolidation. The visualized portion of the heart has a normal appearance . The liver appears normal. The patient is status post cholecystectomy. The pancreas appears normal. The right kidney is absent and there is compensatory hypertrophy of the left kidney. A 1.3 cm left renal cyst is visualized. The adrenal glands appear normal. The spleen has a normal appearance. There are mildly dilated loops of small bowel seen in the left hemiabdomen with air-fluid levels. The small bowel in the region measures on the order of 3.5 cm in diameter. There is mild stranding surrounding a loop of small bowel that shows some bowel wall thickening but is not dilated in the left lower quadrant. Bowel wall measures up to 0.7 cm in thickness.. A  normal appendix is visualized. The osseous structures of the lumbar spine and pelvis appear normal.      There is mild bowel wall thickening with the bowel measuring up to 0.7 cm in thickness and the left lower quadrant with some mild stranding in the adjacent mesentery. The small bowel proximal to the small bowel loops in the left lower quadrant demonstrates some dilatation with air-fluid levels. The primary consideration is that of localized regional enteritis of the small bowel in the left lower quadrant with possible partial small bowel obstruction.  This was communicated to Dr. Sunil Pham in the emergency room on 01/03/2022 at 9:47 AM.        I ordered the above noted radiological studies. Reviewed by me and discussed with radiologist.  See dictation for official radiology interpretation.      PROCEDURES  Procedures      MEDICATIONS GIVEN IN ER    Medications   sodium chloride 0.9 % flush 10 mL (has no administration in time range)   sodium chloride 0.9 % infusion (125 mL/hr Intravenous New Bag 1/3/22 0844)   HYDROmorphone (DILAUDID) injection 0.5 mg (0.5 mg Intravenous Given 1/3/22 0848)   ondansetron (ZOFRAN) injection 4 mg (4 mg Intravenous Given 1/3/22 0848)   iopamidol (ISOVUE-300) 61 % injection 100 mL (85 mL Intravenous Given 1/3/22 0857)         PROGRESS, DATA ANALYSIS, CONSULTS, AND MEDICAL DECISION MAKING    All labs have been independently reviewed by me.  All radiology studies have been reviewed by me and discussed with radiologist dictating the report.   EKG's independently viewed and interpreted by me.  Discussion below represents my analysis of pertinent findings related to patient's condition, differential diagnosis, treatment plan and final disposition.      ED Course as of 01/03/22 1031   Mon Jan 03, 2022   0814 Triage lab tests are reviewed and show elevated white count of 12.6.  Chemistries are fairly unremarkable and urinalysis does show 1+ bacteria and 3-5 white blood cells but is  "relatively clear.  Patient is having no dysuria and I do not believe UTI represents acute infection. [DB]   0826 JGC-31-alet-old female with history of multiple prior abdominal surgeries presents with abdominal pain swelling and distention.  Exam and history is most consistent with bowel obstruction.  Differential diagnosis in this patient would include but is not limited to:  Bowel obstruction  Diverticulitis/colitis  GI infection  Aortic aneurysm  Coronary disease  Urinary problem [DB]   0827 At this point it looks like we are dealing with a likely bowel obstruction.  We will go ahead and give some IV Dilaudid and Zofran to help with pain and nausea.  We will go ahead and give some IV fluids as patient will likely be n.p.o. for some time.  We will get CT scan of the abdomen for further assessment of patient with abdominal distention, pain and elevated white blood cell count. [DB]   0828 I discussed CT scan of the abdomen with Dr. Lloyd Nascimento.  He does report mild distention consistent with bowel obstruction without clear transition point.  There is some bowel wall thickening which could be suggestive of \"enteritis\".  Given that there is not clear-cut mechanical cause, will ask patient to be admitted to the medical service rather than surgical. [DB]   1031 I spoke with Dr. Clem Daryb who admit on behalf of [facility]. [DB]      ED Course User Index  [DB] Sunil Pham MD       AS OF 10:31 EST VITALS:    BP - 131/98  HR - 88  TEMP - 97.8 °F (36.6 °C) (Oral)  O2 SATS - 92%      DIAGNOSIS  Final diagnoses:   Generalized abdominal pain   Partial intestinal obstruction, unspecified cause (HCC)         DISPOSITION  Admission         Sunil Pham MD  01/03/22 1031    "

## 2022-01-03 NOTE — H&P
Patient Name:  Ken Galo  YOB: 1960  MRN:  [id number]  Admit Date:  1/3/2022  Patient Care Team:  Rojas Galeas MD as PCP - General (Family Medicine)      Subjective   History Present Illness     Chief Complaint   Patient presents with   • Abdominal Pain       Ms. Galo is a 61 y.o. female smoker with a history of DM, HLD, HTN, RA, sleep apnea that presents to [facility] complaining of abdominal pain. This started around midnight, described as sharp with associated nausea. She reports some vomiting in ED but this has resolved. Last BM was yesterday, loose which is her baseline. She has hx of multiple abdominal surgeries including colon resection. Denies fever, chest pain, shortness of breath, cough, dysuria, LE swelling. She is fully vaccinated against Covid 19.    Afebrile. HR controlled. BP stable. On room air. WBC 12.59, Hgb 14.2. Lipase 41. Gluc 163, CO2 29.1, Alk phos 122. Covid neg. UA tr leuk est, neg nitrite, 3-5 WBC, 1+ bact, 3-6 squam epith cells. CT A/P: primary consideration of localized regional enteritis of small bowel in LLQ w/possible partial SBO      Review of Systems   Constitutional: Negative for fever.   HENT: Negative for congestion.    Respiratory: Negative for shortness of breath.    Cardiovascular: Negative for chest pain and leg swelling.   Gastrointestinal: Positive for abdominal pain, nausea and vomiting. Negative for constipation.   Genitourinary: Negative for difficulty urinating and dysuria.   Musculoskeletal: Negative for arthralgias and myalgias.   Skin: Negative for rash.   Neurological: Negative for headaches.   Psychiatric/Behavioral: Negative for sleep disturbance.        Personal History     Past Medical History:   Diagnosis Date   • Arthritis    • Diabetes mellitus (HCC)    • GERD (gastroesophageal reflux disease)    • Hyperlipidemia    • Hypertension    • RA (rheumatoid arthritis) (HCC)    • Sleep apnea     NO CPAP     Past  Surgical History:   Procedure Laterality Date   • COLON RESECTION     • COLONOSCOPY     • HYSTERECTOMY     • LAPAROSCOPIC CHOLECYSTECTOMY     • LUNG LOBECTOMY      RT LL.   • TOTAL KNEE ARTHROPLASTY Right 2019    Procedure: TOTAL KNEE ARTHROPLASTY;  Surgeon: Harshal Stokes MD;  Location: [facility] OR;  Service: Orthopedics     Family History   Problem Relation Age of Onset   • Hypertension Mother    • Hyperlipidemia Mother    • Diabetes Mother    • Hypertension Father    • Hyperlipidemia Father    • Diabetes Father    • Hypertension Maternal Grandmother    • Hypertension Maternal Grandfather    • Hypertension Paternal Grandmother    • Hypertension Paternal Grandfather    • Malig Hyperthermia Neg Hx      Social History     Tobacco Use   • Smoking status: Former Smoker     Years: 30.00     Types: Cigarettes     Quit date: 2019     Years since quittin.4   • Smokeless tobacco: Never Used   • Tobacco comment: QUIT 8 DAYS AGO   Substance Use Topics   • Alcohol use: No   • Drug use: No     No current facility-administered medications on file prior to encounter.     Current Outpatient Medications on File Prior to Encounter   Medication Sig Dispense Refill   • BENAZEPRIL HCL PO Take 40 mg by mouth Daily. Unsure of dosage      • cetirizine (zyrTEC) 10 MG tablet Take 10 mg by mouth Daily.     • Cholecalciferol (VITAMIN D3) 5000 units capsule capsule Take 5,000 Units by mouth Daily.     • esomeprazole (nexIUM) 40 MG capsule Take 40 mg by mouth Every Morning Before Breakfast.     • fluticasone (FLONASE) 50 MCG/ACT nasal spray 2 sprays into the nostril(s) as directed by provider Daily.     • HYDROcodone-acetaminophen (NORCO)  MG per tablet Take 1 tablet by mouth Every 6 (Six) Hours As Needed for Moderate Pain .     • Multiple Vitamins-Minerals (MULTIVITAMIN ADULT PO) Take 1 tablet by mouth Daily.     • pravastatin (PRAVACHOL) 10 MG tablet Take 10 mg by mouth Daily.     • SITagliptin-metFORMIN HCl (JANUMET  PO) Take 1 tablet by mouth Daily.       Allergies   Allergen Reactions   • Clindamycin Unknown - High Severity and GI Bleeding   • 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  Had allergy testing to diagnose allergies after an event   • Clindamycin/Lincomycin Other (See Comments)     RECTAL BLEEDING   • Vinyl Ether Rash     Put on vinyl gloves and caused a rash within 5 minutes       Objective    Objective     Vital Signs  Temp:  [97.8 °F (36.6 °C)-98.4 °F (36.9 °C)] 98.4 °F (36.9 °C)  Heart Rate:  [] 107  Resp:  [18-20] 18  BP: (131-161)/() 161/83  SpO2:  [91 %-98 %] 91 %  on   ;   Device (Oxygen Therapy): room air  Body mass index is 31.01 kg/m².    Physical Exam  Vitals and nursing note reviewed.   Constitutional:       General: She is not in acute distress.  HENT:      Mouth/Throat:      Mouth: Mucous membranes are moist.   Eyes:      Conjunctiva/sclera: Conjunctivae normal.   Cardiovascular:      Rate and Rhythm: Normal rate and regular rhythm.   Pulmonary:      Effort: Pulmonary effort is normal. No respiratory distress.      Breath sounds: Normal breath sounds.   Abdominal:      Palpations: Abdomen is soft.      Tenderness: There is abdominal tenderness. There is guarding.      Comments: Bowel sound hypoactive   Musculoskeletal:      Cervical back: Neck supple.      Right lower leg: No edema.      Left lower leg: No edema.   Skin:     General: Skin is warm and dry.   Neurological:      Mental Status: She is alert and oriented to person, place, and time.   Psychiatric:         Mood and Affect: Mood normal.         Behavior: Behavior normal.         Results Review:  I reviewed the patient's new clinical results.  I reviewed the patient's new imaging results and agree with the interpretation.  I reviewed the patient's other test results and agree with the interpretation  I personally viewed and interpreted the patient's EKG/Telemetry data  Discussed with ED  provider.    Lab Results (last 24 hours)     Procedure Component Value Units Date/Time    CBC & Differential [[id number]]  (Abnormal) Collected: 01/03/22 0329    Specimen: Blood Updated: 01/03/22 0344    Narrative:      The following orders were created for panel order CBC & Differential.  Procedure                               Abnormality         Status                     ---------                               -----------         ------                     CBC Auto Differential[[id number]]        Abnormal            Final result                 Please view results for these tests on the individual orders.    Comprehensive Metabolic Panel [[id number]]  (Abnormal) Collected: 01/03/22 0329    Specimen: Blood Updated: 01/03/22 0403     Glucose 163 mg/dL      BUN 12 mg/dL      Creatinine 0.90 mg/dL      Sodium 140 mmol/L      Potassium 3.8 mmol/L      Chloride 101 mmol/L      CO2 29.1 mmol/L      Calcium 9.7 mg/dL      Total Protein 7.3 g/dL      Albumin 4.50 g/dL      ALT (SGPT) 17 U/L      AST (SGOT) 11 U/L      Alkaline Phosphatase 122 U/L      Total Bilirubin 0.2 mg/dL      eGFR  African Amer 78 mL/min/1.73      Globulin 2.8 gm/dL      A/G Ratio 1.6 g/dL      BUN/Creatinine Ratio 13.3     Anion Gap 9.9 mmol/L     Narrative:      GFR Normal >60  Chronic Kidney Disease <60  Kidney Failure <15      Lipase [[id number]]  (Normal) Collected: 01/03/22 0329    Specimen: Blood Updated: 01/03/22 0403     Lipase 41 U/L     CBC Auto Differential [[id number]]  (Abnormal) Collected: 01/03/22 0329    Specimen: Blood Updated: 01/03/22 0344     WBC 12.59 10*3/mm3      RBC 5.32 10*6/mm3      Hemoglobin 14.2 g/dL      Hematocrit 43.1 %      MCV 81.0 fL      MCH 26.7 pg      MCHC 32.9 g/dL      RDW 14.2 %      RDW-SD 41.4 fl      MPV 10.4 fL      Platelets 310 10*3/mm3      Neutrophil % 76.6 %      Lymphocyte % 17.2 %      Monocyte % 4.9 %      Eosinophil % 0.8 %      Basophil % 0.2 %      Immature Grans % 0.3 %      Neutrophils,  Absolute 9.64 10*3/mm3      Lymphocytes, Absolute 2.16 10*3/mm3      Monocytes, Absolute 0.62 10*3/mm3      Eosinophils, Absolute 0.10 10*3/mm3      Basophils, Absolute 0.03 10*3/mm3      Immature Grans, Absolute 0.04 10*3/mm3      nRBC 0.0 /100 WBC     Urinalysis With Microscopic If Indicated (No Culture) - Urine, Clean Catch [[id number]]  (Abnormal) Collected: 01/03/22 0330    Specimen: Urine, Clean Catch Updated: 01/03/22 0339     Color, UA Yellow     Appearance, UA Clear     pH, UA <=5.0     Specific Gravity, UA >=1.030     Glucose, UA Negative     Ketones, UA Trace     Bilirubin, UA Negative     Blood, UA Negative     Protein, UA 30 mg/dL (1+)     Leuk Esterase, UA Trace     Nitrite, UA Negative     Urobilinogen, UA 1.0 E.U./dL    Urinalysis, Microscopic Only - Urine, Clean Catch [[id number]]  (Abnormal) Collected: 01/03/22 0330    Specimen: Urine, Clean Catch Updated: 01/03/22 0350     RBC, UA None Seen /HPF      WBC, UA 3-5 /HPF      Bacteria, UA 1+ /HPF      Squamous Epithelial Cells, UA 3-6 /HPF      Hyaline Casts, UA 7-12 /LPF      Methodology Manual Light Microscopy    COVID PRE-OP / PRE-PROCEDURE SCREENING ORDER (NO ISOLATION) - Swab, Nasopharynx [[id number]]  (Normal) Collected: 01/03/22 0844    Specimen: Swab from Nasopharynx Updated: 01/03/22 0956    Narrative:      The following orders were created for panel order COVID PRE-OP / PRE-PROCEDURE SCREENING ORDER (NO ISOLATION) - Swab, Nasopharynx.  Procedure                               Abnormality         Status                     ---------                               -----------         ------                     COVID-19,[facility] IN-HOUSE...[[id number]]  Normal              Final result                 Please view results for these tests on the individual orders.    COVID-19,[facility] IN-HOUSE CEPHEID/BRETT NP SWAB IN TRANSPORT MEDIA 8-12 HR TAT - Swab, Nasopharynx [[id number]]  (Normal) Collected: 01/03/22 0844    Specimen: Swab from Nasopharynx Updated:  01/03/22 0956     COVID19 Not Detected    Narrative:      Fact sheet for providers: https://www.fda.gov/media/126938/download     Fact sheet for patients: https://www.fda.gov/media/896861/download          Imaging Results (Last 24 Hours)     Procedure Component Value Units Date/Time    CT Abdomen Pelvis With Contrast [[id number]] Collected: 01/03/22 1026     Updated: 01/03/22 1302    Narrative:      EXAMINATION: CT OF THE ABDOMEN AND PELVIS WITH CONTRAST     HISTORY: 61-year-old female with a history of abdominal pain, distention  and back pain.     TECHNIQUE: Contiguous axial images were obtained through the abdomen and  pelvis following intravenous administration of nonionic contrast. Oral  contrast  was not administered. Radiation dose reduction techniques were  utilized, including automated exposure control and exposure modulation  based on body size.     COMPARISON: CT of abdomen and pelvis with contrast, 04/14/2003     FINDINGS: The visualized portion of the lung basesare clear of  consolidation. The visualized portion of the heart has a normal  appearance . The liver appears normal. The patient is status post  cholecystectomy. The pancreas appears normal. The right kidney is absent  and there is compensatory hypertrophy of the left kidney. A 1.3 cm left  renal cyst is visualized. The adrenal glands appear normal. The spleen  has a normal appearance. There are mildly dilated loops of small bowel  seen in the left hemiabdomen with air-fluid levels. The small bowel in  the region measures on the order of 3.5 cm in diameter. There is mild  stranding surrounding a loop of small bowel that shows some bowel wall  thickening but is not dilated in the left lower quadrant. Bowel wall  measures up to 0.7 cm in thickness.. A normal appendix is visualized.  The osseous structures of the lumbar spine and pelvis appear normal.       Impression:      There is mild bowel wall thickening with the bowel measuring  up to 0.7 cm  in thickness and the left lower quadrant with some mild  stranding in the adjacent mesentery. The small bowel proximal to the  small bowel loops in the left lower quadrant demonstrates some  dilatation with air-fluid levels. The primary consideration is that of  localized regional enteritis of the small bowel in the left lower  quadrant with possible partial small bowel obstruction.     This was communicated to Dr. Sunil Pham in the emergency room on  01/03/2022 at 9:47 AM.     This report was finalized on 1/3/2022 12:59 PM by Dr. Tee Nascimento M.D.             Results for orders placed in visit on 07/27/20    Adult Transthoracic Echo Complete W/ Cont if Necessary Per Protocol    Interpretation Summary  · Left ventricular diastolic dysfunction (grade I) consistent with impaired relaxation.  · Calculated EF = 69.0%  · There is no evidence of pericardial effusion.      ECG 12 Lead   Preliminary Result   HEART RATE= 93  bpm   RR Interval= 648  ms   NE Interval= 157  ms   P Horizontal Axis= -4  deg   P Front Axis= 61  deg   QRSD Interval= 103  ms   QT Interval= 371  ms   QRS Axis= 28  deg   T Wave Axis= 38  deg   - ABNORMAL ECG -   Sinus rhythm   Low voltage, precordial leads   Consider anteroseptal infarct   Minimal ST elevation, inferior leads   Electronically Signed By:    Date and Time of Study: 2022-01-03 03:02:06           Assessment/Plan     Active Hospital Problems    Diagnosis  POA   • **Partial small bowel obstruction (HCC) [K56.600]  Yes   • Generalized abdominal pain [R10.84]  Yes   • Type 2 diabetes mellitus, without long-term current use of insulin (HCC) [E11.9]  Yes   • Primary hypertension [I10]  Yes   • Tobacco use [Z72.0]  Yes      Resolved Hospital Problems   No resolved problems to display.       Ms. Galo is a 61 y.o. female smoker with a history of DM, HLD, HTN, RA, sleep apnea who is admitted with abdominal pain with partial small bowel obstruction    -NPO. IVF's. Analgesics/Antiemetics as  needed. KUB tomorrow. Lipase wnl. Mildly elevated WBC likely reactive  -Monitor BG trends. Correctional scale insulin. Hold oral hypoglycemic. Check A1C  -BP stable. Denies chest pain, chf symptoms. Echo 7/20  -Encourage smoking cessation; refusing nicotine patch while admitted  -Further recommendations based on hospital course    Home meds pending reconciliation    · I discussed the patient's findings and my recommendations with patient, nursing staff and ED provider.    VTE Prophylaxis - SCDs.  Code Status - Full code.       DENISE Gallo  [facility]  01/03/22  14:27 EST

## 2022-01-03 NOTE — PLAN OF CARE
Goal Outcome Evaluation:  Plan of Care Reviewed With: patient           Outcome Summary: rcvd pt this evening from ED w/co pain  and per ED RN no meds gvn for several hours. pt arrived on floor MD concerned that no IVF started, adv MD pt just arrived and will start once rcvd by pharm. onve IVF rcvd, pt c.o pain and IV meds gvn, family at bs. pain controlled at this time. No s/s of distress noted. Will continue to monitor and support as needed.   Patient was seen and assessed while wearing personal protective equipment including facemask, protective eyewear and gloves.  Hand hygiene performed prior to entering the room and upon exiting with doffing of gloves.

## 2022-01-04 ENCOUNTER — APPOINTMENT (OUTPATIENT)
Dept: GENERAL RADIOLOGY | Facility: HOSPITAL | Age: 62
End: 2022-01-04

## 2022-01-04 VITALS
SYSTOLIC BLOOD PRESSURE: 133 MMHG | BODY MASS INDEX: 31.41 KG/M2 | DIASTOLIC BLOOD PRESSURE: 83 MMHG | RESPIRATION RATE: 18 BRPM | OXYGEN SATURATION: 95 % | HEART RATE: 85 BPM | HEIGHT: 69 IN | WEIGHT: 212.08 LBS | TEMPERATURE: 97.5 F

## 2022-01-04 LAB
ANION GAP SERPL CALCULATED.3IONS-SCNC: 8.2 MMOL/L (ref 5–15)
BUN SERPL-MCNC: 9 MG/DL (ref 6–20)
BUN/CREAT SERPL: 12.9 (ref 7–25)
CALCIUM SPEC-SCNC: 9 MG/DL (ref 8.6–10.5)
CHLORIDE SERPL-SCNC: 105 MMOL/L (ref 98–107)
CO2 SERPL-SCNC: 28.8 MMOL/L (ref 22–29)
CREAT SERPL-MCNC: 0.7 MG/DL (ref 0.57–1)
DEPRECATED RDW RBC AUTO: 42 FL (ref 37–54)
ERYTHROCYTE [DISTWIDTH] IN BLOOD BY AUTOMATED COUNT: 14 % (ref 12.3–15.4)
GFR SERPL CREATININE-BSD FRML MDRD: 104 ML/MIN/1.73
GLUCOSE BLDC GLUCOMTR-MCNC: 121 MG/DL (ref 70–130)
GLUCOSE BLDC GLUCOMTR-MCNC: 81 MG/DL (ref 70–130)
GLUCOSE BLDC GLUCOMTR-MCNC: 91 MG/DL (ref 70–130)
GLUCOSE SERPL-MCNC: 103 MG/DL (ref 65–99)
HCT VFR BLD AUTO: 38.9 % (ref 34–46.6)
HGB BLD-MCNC: 12.6 G/DL (ref 12–15.9)
MAGNESIUM SERPL-MCNC: 1.6 MG/DL (ref 1.6–2.6)
MCH RBC QN AUTO: 26.8 PG (ref 26.6–33)
MCHC RBC AUTO-ENTMCNC: 32.4 G/DL (ref 31.5–35.7)
MCV RBC AUTO: 82.6 FL (ref 79–97)
PHOSPHATE SERPL-MCNC: 3.4 MG/DL (ref 2.5–4.5)
PLATELET # BLD AUTO: 274 10*3/MM3 (ref 140–450)
PMV BLD AUTO: 10.7 FL (ref 6–12)
POTASSIUM SERPL-SCNC: 3.9 MMOL/L (ref 3.5–5.2)
RBC # BLD AUTO: 4.71 10*6/MM3 (ref 3.77–5.28)
SODIUM SERPL-SCNC: 142 MMOL/L (ref 136–145)
WBC NRBC COR # BLD: 6.23 10*3/MM3 (ref 3.4–10.8)

## 2022-01-04 PROCEDURE — G0378 HOSPITAL OBSERVATION PER HR: HCPCS

## 2022-01-04 PROCEDURE — 74018 RADEX ABDOMEN 1 VIEW: CPT

## 2022-01-04 PROCEDURE — 85027 COMPLETE CBC AUTOMATED: CPT | Performed by: HOSPITALIST

## 2022-01-04 PROCEDURE — 96375 TX/PRO/DX INJ NEW DRUG ADDON: CPT

## 2022-01-04 PROCEDURE — 96376 TX/PRO/DX INJ SAME DRUG ADON: CPT

## 2022-01-04 PROCEDURE — 80048 BASIC METABOLIC PNL TOTAL CA: CPT | Performed by: HOSPITALIST

## 2022-01-04 PROCEDURE — 82962 GLUCOSE BLOOD TEST: CPT

## 2022-01-04 PROCEDURE — 25010000002 MORPHINE PER 10 MG: Performed by: HOSPITALIST

## 2022-01-04 PROCEDURE — 84100 ASSAY OF PHOSPHORUS: CPT | Performed by: HOSPITALIST

## 2022-01-04 PROCEDURE — 36415 COLL VENOUS BLD VENIPUNCTURE: CPT | Performed by: HOSPITALIST

## 2022-01-04 PROCEDURE — 83735 ASSAY OF MAGNESIUM: CPT | Performed by: HOSPITALIST

## 2022-01-04 RX ORDER — AMOXICILLIN 250 MG
2 CAPSULE ORAL NIGHTLY
Status: DISCONTINUED | OUTPATIENT
Start: 2022-01-04 | End: 2022-01-04 | Stop reason: HOSPADM

## 2022-01-04 RX ORDER — AMOXICILLIN 250 MG
2 CAPSULE ORAL NIGHTLY
Qty: 30 TABLET | Refills: 0 | Status: SHIPPED | OUTPATIENT
Start: 2022-01-04

## 2022-01-04 RX ORDER — POLYETHYLENE GLYCOL 3350 17 G/17G
17 POWDER, FOR SOLUTION ORAL DAILY
Qty: 14 EACH | Refills: 0 | Status: SHIPPED | OUTPATIENT
Start: 2022-01-05

## 2022-01-04 RX ORDER — POLYETHYLENE GLYCOL 3350 17 G/17G
17 POWDER, FOR SOLUTION ORAL DAILY
Status: DISCONTINUED | OUTPATIENT
Start: 2022-01-04 | End: 2022-01-04 | Stop reason: HOSPADM

## 2022-01-04 RX ADMIN — FLUTICASONE PROPIONATE 2 SPRAY: 50 SPRAY, METERED NASAL at 08:00

## 2022-01-04 RX ADMIN — CETIRIZINE HYDROCHLORIDE 10 MG: 10 TABLET ORAL at 08:00

## 2022-01-04 RX ADMIN — DOCUSATE SODIUM 50MG AND SENNOSIDES 8.6MG 2 TABLET: 8.6; 5 TABLET, FILM COATED ORAL at 08:00

## 2022-01-04 RX ADMIN — POLYETHYLENE GLYCOL 3350 17 G: 17 POWDER, FOR SOLUTION ORAL at 08:00

## 2022-01-04 RX ADMIN — PANTOPRAZOLE SODIUM 40 MG: 40 INJECTION, POWDER, FOR SOLUTION INTRAVENOUS at 05:49

## 2022-01-04 RX ADMIN — SODIUM CHLORIDE, PRESERVATIVE FREE 10 ML: 5 INJECTION INTRAVENOUS at 08:00

## 2022-01-04 RX ADMIN — MORPHINE SULFATE 2 MG: 2 INJECTION, SOLUTION INTRAMUSCULAR; INTRAVENOUS at 01:27

## 2022-01-04 NOTE — PLAN OF CARE
Goal Outcome Evaluation:           Progress: improving  Outcome Summary: Pts c/o abd pain has reduced. Morphine only needed once this shift. Pt had a large BM and states abd feels much better. Up adlib. Iv fluids infusing. VSS. Will continue to monitor.

## 2022-01-04 NOTE — DISCHARGE SUMMARY
Patient Name: Ken Henry  : 1960  MRN: [id number]    Date of Admission: 1/3/2022  Date of Discharge:  2022  Primary Care Physician: Rojas Galeas MD      Chief Complaint:   Abdominal Pain      Discharge Diagnoses     Active Hospital Problems    Diagnosis  POA   • **Partial small bowel obstruction (HCC) [K56.600]  Yes   • Generalized abdominal pain [R10.84]  Yes   • Type 2 diabetes mellitus, without long-term current use of insulin (HCC) [E11.9]  Yes   • Primary hypertension [I10]  Yes   • Tobacco use [Z72.0]  Yes      Resolved Hospital Problems   No resolved problems to display.        Hospital Course     Ms. Henry is a 61 y.o. female with a history of DM, HLD, HTN, RA, sleep apnea who presented to [facility] initially complaining of abdominal pain.  Please see the admitting history and physical for further details.  She was found to have evidence of partial SBO and was admitted to the hospital for further evaluation and treatment.  She was made NPO and placed on IVF's. She had mild leukocytosis that has resolved; likely reactive. NGT was not required. She had a large BM w/miralax and stools softeners. She has had improvement of abdominal pain and has tolerated clear liquids. KUB today did not show obstruction.  She has not required IV pain meds since 0130 this morning. She is feeling much better and if she tolerates soft foods for dinner she can be discharged home this evening. VS and labs are stable. She will follow up with PCP in 1 week.   Day of Discharge     Subjective:  Abdominal pain improved. Still w/some bloating. No fever, n/v. Had large bowel movement. Wants to try soft foods for dinner and is hopeful for discharge home if tolerates.     Physical Exam:  Temp:  [97.5 °F (36.4 °C)-98.3 °F (36.8 °C)] 97.5 °F (36.4 °C)  Heart Rate:  [66-98] 85  Resp:  [18] 18  BP: (122-143)/(64-92) 133/83  Body mass index is 31.32 kg/m².  Physical Exam  Vitals and nursing note  reviewed.   Constitutional:       General: She is not in acute distress.  HENT:      Head: Normocephalic.      Mouth/Throat:      Mouth: Mucous membranes are moist.   Eyes:      Conjunctiva/sclera: Conjunctivae normal.   Cardiovascular:      Rate and Rhythm: Normal rate and regular rhythm.   Pulmonary:      Effort: Pulmonary effort is normal. No respiratory distress.      Breath sounds: Normal breath sounds.   Abdominal:      General: Bowel sounds are normal. There is no distension.      Palpations: Abdomen is soft.      Tenderness: There is no guarding.   Musculoskeletal:      Cervical back: Neck supple.      Right lower leg: No edema.      Left lower leg: No edema.   Skin:     General: Skin is warm and dry.   Neurological:      Mental Status: She is alert and oriented to person, place, and time.   Psychiatric:         Mood and Affect: Mood normal.         Behavior: Behavior normal.         Consultants     Consult Orders (all) (From admission, onward)     Start     Ordered    01/03/22 1001  [facility] (on-call MD unless specified) Details  Once        Specialty:  Hospitalist  Provider:  Macario Darby MD    01/03/22 1000              Procedures     * Surgery not found *      Imaging Results (All)     Procedure Component Value Units Date/Time    XR Abdomen KUB [[id number]] Collected: 01/04/22 0516     Updated: 01/04/22 0516    Narrative:        Patient: DAPHNIE ARRIAZA  Time Out: 05:15  Exam(s): FILM ABDOMEN     EXAM:    XR Abdomen, 2 Views    CLINICAL HISTORY:     Reason for exam: SBO.    TECHNIQUE:    Frontal view of the abdomen pelvis with upright view of the abdomen.    COMPARISON:    Abdomen x-ray of 4 16 2003    FINDINGS:    Intraperitoneal space:  No free air.    Gastrointestinal tract:  Moderate stool throughout the large bowel.    Organs:  Interval cholecystectomy.    Bones joints:  Unremarkable.    IMPRESSION:       1.  No evidence of bowel obstruction or perforation.  Based on index of   suspicion,  further imaging by CT could be pursued.  2.  Moderate stool throughout the large bowel could reflect constipation   in the appropriate clinical context.      Impression:          Electronically signed by Mary Sotelo MD on 01-04-22 at 0515    CT Abdomen Pelvis With Contrast [[id number]] Collected: 01/03/22 1026     Updated: 01/03/22 1302    Narrative:      EXAMINATION: CT OF THE ABDOMEN AND PELVIS WITH CONTRAST     HISTORY: 61-year-old female with a history of abdominal pain, distention  and back pain.     TECHNIQUE: Contiguous axial images were obtained through the abdomen and  pelvis following intravenous administration of nonionic contrast. Oral  contrast  was not administered. Radiation dose reduction techniques were  utilized, including automated exposure control and exposure modulation  based on body size.     COMPARISON: CT of abdomen and pelvis with contrast, 04/14/2003     FINDINGS: The visualized portion of the lung basesare clear of  consolidation. The visualized portion of the heart has a normal  appearance . The liver appears normal. The patient is status post  cholecystectomy. The pancreas appears normal. The right kidney is absent  and there is compensatory hypertrophy of the left kidney. A 1.3 cm left  renal cyst is visualized. The adrenal glands appear normal. The spleen  has a normal appearance. There are mildly dilated loops of small bowel  seen in the left hemiabdomen with air-fluid levels. The small bowel in  the region measures on the order of 3.5 cm in diameter. There is mild  stranding surrounding a loop of small bowel that shows some bowel wall  thickening but is not dilated in the left lower quadrant. Bowel wall  measures up to 0.7 cm in thickness.. A normal appendix is visualized.  The osseous structures of the lumbar spine and pelvis appear normal.       Impression:      There is mild bowel wall thickening with the bowel measuring  up to 0.7 cm in thickness and the left lower  quadrant with some mild  stranding in the adjacent mesentery. The small bowel proximal to the  small bowel loops in the left lower quadrant demonstrates some  dilatation with air-fluid levels. The primary consideration is that of  localized regional enteritis of the small bowel in the left lower  quadrant with possible partial small bowel obstruction.     This was communicated to Dr. Sunil Pham in the emergency room on  01/03/2022 at 9:47 AM.     This report was finalized on 1/3/2022 12:59 PM by Dr. Tee Nascimento M.D.             Results for orders placed in visit on 07/27/20    Adult Transthoracic Echo Complete W/ Cont if Necessary Per Protocol    Interpretation Summary  · Left ventricular diastolic dysfunction (grade I) consistent with impaired relaxation.  · Calculated EF = 69.0%  · There is no evidence of pericardial effusion.    Pertinent Labs     Results from last 7 days   Lab Units 01/04/22  0957 01/03/22  0329   WBC 10*3/mm3 6.23 12.59*   HEMOGLOBIN g/dL 12.6 14.2   PLATELETS 10*3/mm3 274 310     Results from last 7 days   Lab Units 01/04/22  0957 01/03/22  0329   SODIUM mmol/L 142 140   POTASSIUM mmol/L 3.9 3.8   CHLORIDE mmol/L 105 101   CO2 mmol/L 28.8 29.1*   BUN mg/dL 9 12   CREATININE mg/dL 0.70 0.90   GLUCOSE mg/dL 103* 163*   EGFR IF AFRICN AM mL/min/1.73 104 78     Results from last 7 days   Lab Units 01/03/22  0329   ALBUMIN g/dL 4.50   BILIRUBIN mg/dL 0.2   ALK PHOS U/L 122*   AST (SGOT) U/L 11   ALT (SGPT) U/L 17     Results from last 7 days   Lab Units 01/04/22  0957 01/03/22  0329   CALCIUM mg/dL 9.0 9.7   ALBUMIN g/dL  --  4.50   MAGNESIUM mg/dL 1.6  --    PHOSPHORUS mg/dL 3.4  --      Results from last 7 days   Lab Units 01/03/22  0329   LIPASE U/L 41             Invalid input(s): LDLCALC      Results from last 7 days   Lab Units 01/03/22  0844   COVID19  Not Detected       Test Results Pending at Discharge       Discharge Details        Discharge Medications      New Medications       Instructions Start Date   polyethylene glycol 17 g packet  Commonly known as: MIRALAX   17 g, Oral, Daily   Start Date: January 5, 2022     sennosides-docusate 8.6-50 MG per tablet  Commonly known as: PERICOLACE   2 tablets, Oral, Nightly         Continue These Medications      Instructions Start Date   BENAZEPRIL HCL PO   40 mg, Oral, Daily, Unsure of dosage      cetirizine 10 MG tablet  Commonly known as: zyrTEC   10 mg, Oral, Daily      esomeprazole 40 MG capsule  Commonly known as: nexIUM   40 mg, Oral, Every Morning Before Breakfast      fluticasone 50 MCG/ACT nasal spray  Commonly known as: FLONASE   2 sprays, Nasal, Daily      HYDROcodone-acetaminophen  MG per tablet  Commonly known as: NORCO   1 tablet, Oral, Every 6 Hours PRN      JANUMET PO   1 tablet, Oral, Daily      multivitamin with minerals tablet tablet   1 tablet, Oral, Daily      pravastatin 10 MG tablet  Commonly known as: PRAVACHOL   10 mg, Oral, Daily      vitamin D3 125 MCG (5000 UT) capsule capsule   5,000 Units, Oral, Daily             Allergies   Allergen Reactions   • Clindamycin Unknown - High Severity and GI Bleeding   • 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  Had allergy testing to diagnose allergies after an event   • Clindamycin/Lincomycin Other (See Comments)     RECTAL BLEEDING   • Vinyl Ether Rash     Put on vinyl gloves and caused a rash within 5 minutes       Discharge Disposition:  Home or Self Care      Discharge Diet:  Diet Order   Procedures   • Diet Regular; GI Soft, Consistent Carbohydrate       Discharge Activity:   Activity Instructions     Activity as Tolerated            CODE STATUS:    Code Status and Medical Interventions:   Ordered at: 01/03/22 1033     Level Of Support Discussed With:    Patient     Code Status (Patient has no pulse and is not breathing):    CPR (Attempt to Resuscitate)     Medical Interventions (Patient has pulse or is breathing):    Full Support       No  future appointments.   Follow-up Information     Rojas Galeas MD Follow up in 1 week(s).    Specialty: Family Medicine  Contact information:  [address] [facility] [address] [phone]                         Time Spent on Discharge:  Greater than 30 minutes      DENISE Gallo  [facility]  01/04/22  16:04 EST

## 2022-01-04 NOTE — CASE MANAGEMENT/SOCIAL WORK
Case Management Discharge Note      Final Note: Home---no needs         Selected Continued Care - Admitted Since 1/3/2022     Destination    No services have been selected for the patient.              Durable Medical Equipment    No services have been selected for the patient.              Dialysis/Infusion    No services have been selected for the patient.              Home Medical Care    No services have been selected for the patient.              Therapy    No services have been selected for the patient.              Community Resources    No services have been selected for the patient.              Community & DME    No services have been selected for the patient.                       Final Discharge Disposition Code: 01 - home or self-care

## 2022-01-04 NOTE — PLAN OF CARE
Goal Outcome Evaluation:      Pt A&O x 4, pt ambulated in room, 2 BM today, pt just finished dinner and is waiting to make sure has no issues with solid food, MD aware and will be notified before discharge, no complaints of pain or nausea. Discharge pending

## 2022-01-04 NOTE — PROGRESS NOTES
Dedicated to Hospital Care    [phone]   LOS: 1 day     Name: Ken Henry  Age/Sex: 61 y.o. female  :  1960        PCP: Rojas Galeas MD  Chief Complaint   Patient presents with   • Abdominal Pain      Subjective   Feeling a lot better.  She had a bowel movement last evening and another one this morning.  She is tolerating clears without any issues.  The pain that she was experiencing yesterday has resolved she is not requiring any IV narcotics.  General: No Fever or Chills, Cardiac: No Chest Pain or Palpitations, Resp: No Cough or SOA, GI: No Nausea, Vomiting, or Diarrhea and Other: No bleeding    cetirizine, 10 mg, Oral, Daily  fluticasone, 2 spray, Each Nare, Daily  insulin lispro, 0-9 Units, Subcutaneous, TID AC  pantoprazole, 40 mg, Intravenous, Q AM  polyethylene glycol, 17 g, Oral, Daily  senna-docusate sodium, 2 tablet, Oral, Nightly  sodium chloride, 10 mL, Intravenous, Q12H           Objective   Vital Signs  Temp:  [97.6 °F (36.4 °C)-98.8 °F (37.1 °C)] 97.8 °F (36.6 °C)  Heart Rate:  [] 83  Resp:  [18] 18  BP: (122-161)/(64-92) 143/92  Body mass index is 31.32 kg/m².  No intake or output data in the 24 hours ending 22 1355    Physical Exam  Vitals and nursing note reviewed.   Constitutional:       Appearance: Normal appearance.   HENT:      Head: Normocephalic and atraumatic.   Cardiovascular:      Rate and Rhythm: Normal rate and regular rhythm.   Pulmonary:      Effort: Pulmonary effort is normal.      Breath sounds: Normal breath sounds.   Abdominal:      General: Bowel sounds are normal.      Palpations: Abdomen is soft.   Neurological:      General: No focal deficit present.      Mental Status: She is alert and oriented to person, place, and time.   Psychiatric:         Mood and Affect: Mood normal.         Behavior: Behavior normal.           Results Review:       I reviewed the patient's new clinical results.  Results from last 7 days   Lab Units 22  7971  01/03/22  0329   WBC 10*3/mm3 6.23 12.59*   HEMOGLOBIN g/dL 12.6 14.2   PLATELETS 10*3/mm3 274 310     Results from last 7 days   Lab Units 01/04/22  0957 01/03/22  0329   SODIUM mmol/L 142 140   POTASSIUM mmol/L 3.9 3.8   CHLORIDE mmol/L 105 101   CO2 mmol/L 28.8 29.1*   BUN mg/dL 9 12   CREATININE mg/dL 0.70 0.90   CALCIUM mg/dL 9.0 9.7   MAGNESIUM mg/dL 1.6  --    PHOSPHORUS mg/dL 3.4  --    Estimated Creatinine Clearance: 106.8 mL/min (by C-G formula based on SCr of 0.7 mg/dL).      Assessment/Plan   Active Hospital Problems    Diagnosis  POA   • **Partial small bowel obstruction (HCC) [K56.600]  Yes   • Generalized abdominal pain [R10.84]  Yes   • Type 2 diabetes mellitus, without long-term current use of insulin (HCC) [E11.9]  Yes   • Primary hypertension [I10]  Yes   • Tobacco use [Z72.0]  Yes      Resolved Hospital Problems   No resolved problems to display.       PLAN  This is a 61-year-old female admitted to the hospital with partial small bowel obstruction  -This resolved with conservative therapy she has had multiple bowel movements and is tolerating a diet.  We will advance her to regular diet this evening if she tolerates that well we will let her go home this evening.  -Plan discussed with the patient at the bedside she knowledge understanding questions addressed and answered      Disposition  Probably home this evening      Macario Darby MD  [facility]  01/04/22  13:55 EST

## 2022-01-04 NOTE — CASE MANAGEMENT/SOCIAL WORK
Discharge Planning Assessment  [facility]     Patient Name: Ken Galo  MRN: [id number]  Today's Date: 1/4/2022    Admit Date: 1/3/2022     Discharge Needs Assessment     Row Name 01/04/22 0936       Living Environment    Lives With spouse; child(vini), dependent    Current Living Arrangements home/apartment/condo    Potentially Unsafe Housing Conditions other (see comments)  no concerns    Primary Care Provided by self    Provides Primary Care For no one    Family Caregiver if Needed spouse    Quality of Family Relationships helpful; involved; supportive    Able to Return to Prior Arrangements yes       Resource/Environmental Concerns    Resource/Environmental Concerns none    Transportation Concerns car, none       Transition Planning    Patient/Family Anticipates Transition to home with family    Patient/Family Anticipated Services at Transition none    Transportation Anticipated family or friend will provide       Discharge Needs Assessment    Readmission Within the Last 30 Days no previous admission in last 30 days    Equipment Currently Used at Home none    Concerns to be Addressed no discharge needs identified; denies needs/concerns at this time    Anticipated Changes Related to Illness none    Equipment Needed After Discharge none               Discharge Plan     Row Name 01/04/22 0936       Plan    Plan Home with family    Patient/Family in Agreement with Plan yes    Plan Comments CCP met with patient at bedside. CCP role explained and discharge planning discussed. Face sheet verified and patient’s PCP is Dr. Galeas. Patient lives with her spouse, daughter and step-daughter. Patient is independent with her ADLs and does not use DME. Patient has not used home health or been to SNF. Patient plans to return home and does not anticipate any discharge needs. Charisse Gama CSW              Continued Care and Services - Admitted Since 1/3/2022    Coordination has not been started for this encounter.        Expected Discharge Date and Time     Expected Discharge Date Expected Discharge Time    Jan 5, 2022          Demographic Summary     Row Name 01/04/22 0921       General Information    Admission Type inpatient    Arrived From emergency department    Referral Source admission list    Reason for Consult discharge planning    Preferred Language English     Used During This Interaction no               Functional Status     Row Name 01/04/22 0936       Functional Status    Usual Activity Tolerance good    Current Activity Tolerance good       Functional Status, IADL    Medications independent    Meal Preparation independent    Housekeeping independent    Laundry independent    Shopping independent       Mental Status    General Appearance WDL WDL       Mental Status Summary    Recent Changes in Mental Status/Cognitive Functioning no changes               Psychosocial    No documentation.                Abuse/Neglect    No documentation.                Legal    No documentation.                Substance Abuse    No documentation.                Patient Forms    No documentation.                   ROBBY Bauer

## 2022-12-30 ENCOUNTER — TRANSCRIBE ORDERS (OUTPATIENT)
Dept: ADMINISTRATIVE | Facility: HOSPITAL | Age: 62
End: 2022-12-30
Payer: COMMERCIAL

## 2022-12-30 DIAGNOSIS — Z12.31 VISIT FOR SCREENING MAMMOGRAM: Primary | ICD-10-CM

## 2023-01-01 NOTE — PERIOPERATIVE NURSING NOTE
Aev RN from [facility] at bedside, speaking with pt, waiting on Dr. Elias.  
Dr. Elias on unit, at bedside talking with patient.  
Dr. Elias states it is ok to proceed with surgery today as planned.  
Dr. Saab notified of abnormal EKG. States to obtain cardiology consult before surgery. Consult call placed to [facility].  
Rita with [facility] returned call, notified of cardiology consult prior to OR, states Dr. Elias will see pt, unsure of a time frame.  
Started first trimester

## 2023-05-02 ENCOUNTER — HOSPITAL ENCOUNTER (OUTPATIENT)
Dept: MAMMOGRAPHY | Facility: HOSPITAL | Age: 63
Discharge: HOME | End: 2023-05-02
Admitting: FAMILY MEDICINE
Payer: COMMERCIAL

## 2023-05-02 DIAGNOSIS — Z12.31 VISIT FOR SCREENING MAMMOGRAM: ICD-10-CM

## 2023-05-02 PROCEDURE — 77067 SCR MAMMO BI INCL CAD: CPT

## 2023-05-02 PROCEDURE — 77063 BREAST TOMOSYNTHESIS BI: CPT

## 2023-05-23 ENCOUNTER — TRANSCRIBE ORDERS (OUTPATIENT)
Dept: ADMINISTRATIVE | Facility: HOSPITAL | Age: 63
End: 2023-05-23
Payer: COMMERCIAL

## 2023-05-23 DIAGNOSIS — R92.8 ABNORMAL MAMMOGRAM: Primary | ICD-10-CM

## 2023-06-02 ENCOUNTER — HOSPITAL ENCOUNTER (OUTPATIENT)
Dept: MAMMOGRAPHY | Facility: HOSPITAL | Age: 63
Discharge: HOME | End: 2023-06-02

## 2023-06-02 ENCOUNTER — HOSPITAL ENCOUNTER (OUTPATIENT)
Dept: ULTRASOUND IMAGING | Facility: HOSPITAL | Age: 63
Discharge: HOME | End: 2023-06-02

## 2023-06-02 DIAGNOSIS — R92.8 ABNORMAL MAMMOGRAM: ICD-10-CM

## 2023-06-02 PROCEDURE — G0279 TOMOSYNTHESIS, MAMMO: HCPCS

## 2023-06-02 PROCEDURE — 76642 ULTRASOUND BREAST LIMITED: CPT

## 2023-06-02 PROCEDURE — 77065 DX MAMMO INCL CAD UNI: CPT

## 2023-06-13 ENCOUNTER — HOSPITAL ENCOUNTER (OUTPATIENT)
Dept: MAMMOGRAPHY | Facility: HOSPITAL | Age: 63
Discharge: HOME | End: 2023-06-13
Payer: COMMERCIAL

## 2023-06-13 ENCOUNTER — PATIENT OUTREACH (OUTPATIENT)
Dept: ONCOLOGY | Facility: HOSPITAL | Age: 63
End: 2023-06-13
Payer: COMMERCIAL

## 2023-06-13 DIAGNOSIS — R92.8 ABNORMAL MAMMOGRAM: ICD-10-CM

## 2023-06-13 PROCEDURE — 88305 TISSUE EXAM BY PATHOLOGIST: CPT | Performed by: FAMILY MEDICINE

## 2023-06-13 PROCEDURE — A4648 IMPLANTABLE TISSUE MARKER: HCPCS

## 2023-06-13 RX ORDER — LIDOCAINE HYDROCHLORIDE 10 MG/ML
10 INJECTION, SOLUTION INFILTRATION; PERINEURAL ONCE
Status: DISCONTINUED | OUTPATIENT
Start: 2023-06-13 | End: 2023-06-14 | Stop reason: HOSPADM

## 2023-06-13 RX ORDER — LIDOCAINE HYDROCHLORIDE AND EPINEPHRINE 10; 10 MG/ML; UG/ML
10 INJECTION, SOLUTION INFILTRATION; PERINEURAL ONCE
Status: DISCONTINUED | OUTPATIENT
Start: 2023-06-13 | End: 2023-06-14 | Stop reason: HOSPADM

## 2023-06-15 ENCOUNTER — PATIENT OUTREACH (OUTPATIENT)
Dept: ONCOLOGY | Facility: HOSPITAL | Age: 63
End: 2023-06-15
Payer: COMMERCIAL

## 2023-06-15 LAB
CYTO UR: NORMAL
LAB AP CASE REPORT: NORMAL
LAB AP CLINICAL INFORMATION: NORMAL
PATH REPORT.FINAL DX SPEC: NORMAL
PATH REPORT.GROSS SPEC: NORMAL

## 2023-06-16 ENCOUNTER — PATIENT OUTREACH (OUTPATIENT)
Dept: ONCOLOGY | Facility: HOSPITAL | Age: 63
End: 2023-06-16
Payer: COMMERCIAL

## 2023-11-30 ENCOUNTER — OFFICE (AMBULATORY)
Dept: URBAN - METROPOLITAN AREA CLINIC 76 | Facility: CLINIC | Age: 63
End: 2023-11-30

## 2023-11-30 VITALS
SYSTOLIC BLOOD PRESSURE: 111 MMHG | HEIGHT: 69 IN | DIASTOLIC BLOOD PRESSURE: 71 MMHG | HEART RATE: 97 BPM | WEIGHT: 193 LBS | OXYGEN SATURATION: 99 %

## 2023-11-30 DIAGNOSIS — K21.9 GASTRO-ESOPHAGEAL REFLUX DISEASE WITHOUT ESOPHAGITIS: ICD-10-CM

## 2023-11-30 DIAGNOSIS — K58.0 IRRITABLE BOWEL SYNDROME WITH DIARRHEA: ICD-10-CM

## 2023-11-30 DIAGNOSIS — R14.0 ABDOMINAL DISTENSION (GASEOUS): ICD-10-CM

## 2023-11-30 DIAGNOSIS — R10.13 EPIGASTRIC PAIN: ICD-10-CM

## 2023-11-30 DIAGNOSIS — Z86.010 PERSONAL HISTORY OF COLONIC POLYPS: ICD-10-CM

## 2023-11-30 PROBLEM — K31.7 POLYP OF STOMACH AND DUODENUM: Status: ACTIVE | Noted: 2019-10-17

## 2023-11-30 PROCEDURE — 99204 OFFICE O/P NEW MOD 45 MIN: CPT | Performed by: NURSE PRACTITIONER

## 2023-12-15 VITALS
HEART RATE: 86 BPM | DIASTOLIC BLOOD PRESSURE: 68 MMHG | SYSTOLIC BLOOD PRESSURE: 105 MMHG | TEMPERATURE: 97.2 F | DIASTOLIC BLOOD PRESSURE: 83 MMHG | HEART RATE: 71 BPM | HEART RATE: 65 BPM | WEIGHT: 194 LBS | HEART RATE: 91 BPM | DIASTOLIC BLOOD PRESSURE: 62 MMHG | DIASTOLIC BLOOD PRESSURE: 219 MMHG | DIASTOLIC BLOOD PRESSURE: 67 MMHG | HEART RATE: 97 BPM | RESPIRATION RATE: 13 BRPM | HEIGHT: 69 IN | RESPIRATION RATE: 9 BRPM | SYSTOLIC BLOOD PRESSURE: 138 MMHG | OXYGEN SATURATION: 97 % | DIASTOLIC BLOOD PRESSURE: 61 MMHG | RESPIRATION RATE: 15 BRPM | OXYGEN SATURATION: 98 % | DIASTOLIC BLOOD PRESSURE: 86 MMHG | OXYGEN SATURATION: 93 % | DIASTOLIC BLOOD PRESSURE: 78 MMHG | HEART RATE: 83 BPM | OXYGEN SATURATION: 94 % | SYSTOLIC BLOOD PRESSURE: 112 MMHG | SYSTOLIC BLOOD PRESSURE: 110 MMHG | HEART RATE: 79 BPM | SYSTOLIC BLOOD PRESSURE: 246 MMHG | SYSTOLIC BLOOD PRESSURE: 127 MMHG | HEART RATE: 82 BPM | DIASTOLIC BLOOD PRESSURE: 56 MMHG | RESPIRATION RATE: 17 BRPM | SYSTOLIC BLOOD PRESSURE: 107 MMHG | OXYGEN SATURATION: 95 % | SYSTOLIC BLOOD PRESSURE: 102 MMHG | OXYGEN SATURATION: 99 % | SYSTOLIC BLOOD PRESSURE: 166 MMHG | SYSTOLIC BLOOD PRESSURE: 143 MMHG

## 2023-12-19 ENCOUNTER — AMBULATORY SURGICAL CENTER (AMBULATORY)
Dept: URBAN - METROPOLITAN AREA SURGERY 17 | Facility: SURGERY | Age: 63
End: 2023-12-19

## 2023-12-19 ENCOUNTER — OFFICE (AMBULATORY)
Dept: URBAN - METROPOLITAN AREA PATHOLOGY 4 | Facility: PATHOLOGY | Age: 63
End: 2023-12-19

## 2023-12-19 DIAGNOSIS — K31.89 OTHER DISEASES OF STOMACH AND DUODENUM: ICD-10-CM

## 2023-12-19 DIAGNOSIS — K63.5 POLYP OF COLON: ICD-10-CM

## 2023-12-19 DIAGNOSIS — Z86.010 PERSONAL HISTORY OF COLONIC POLYPS: ICD-10-CM

## 2023-12-19 DIAGNOSIS — D12.3 BENIGN NEOPLASM OF TRANSVERSE COLON: ICD-10-CM

## 2023-12-19 DIAGNOSIS — D12.2 BENIGN NEOPLASM OF ASCENDING COLON: ICD-10-CM

## 2023-12-19 DIAGNOSIS — Z09 ENCOUNTER FOR FOLLOW-UP EXAMINATION AFTER COMPLETED TREATMEN: ICD-10-CM

## 2023-12-19 DIAGNOSIS — R10.13 EPIGASTRIC PAIN: ICD-10-CM

## 2023-12-19 LAB
GI HISTOLOGY: A. UNSPECIFIED: (no result)
GI HISTOLOGY: B. UNSPECIFIED: (no result)
GI HISTOLOGY: C. SELECT: (no result)
GI HISTOLOGY: D. SELECT: (no result)
GI HISTOLOGY: E. UNSPECIFIED: (no result)
GI HISTOLOGY: F. UNSPECIFIED: (no result)
GI HISTOLOGY: PDF REPORT: (no result)

## 2023-12-19 PROCEDURE — 43239 EGD BIOPSY SINGLE/MULTIPLE: CPT | Performed by: INTERNAL MEDICINE

## 2023-12-19 PROCEDURE — 45385 COLONOSCOPY W/LESION REMOVAL: CPT | Mod: 33 | Performed by: INTERNAL MEDICINE

## 2023-12-19 PROCEDURE — 88305 TISSUE EXAM BY PATHOLOGIST: CPT | Performed by: INTERNAL MEDICINE

## 2023-12-19 PROCEDURE — 88342 IMHCHEM/IMCYTCHM 1ST ANTB: CPT | Performed by: INTERNAL MEDICINE

## 2024-03-01 ENCOUNTER — OFFICE (AMBULATORY)
Dept: URBAN - METROPOLITAN AREA CLINIC 76 | Facility: CLINIC | Age: 64
End: 2024-03-01
Payer: COMMERCIAL

## 2024-03-01 VITALS
DIASTOLIC BLOOD PRESSURE: 69 MMHG | HEIGHT: 69 IN | WEIGHT: 201 LBS | OXYGEN SATURATION: 98 % | SYSTOLIC BLOOD PRESSURE: 121 MMHG | HEART RATE: 92 BPM

## 2024-03-01 DIAGNOSIS — K21.9 GASTRO-ESOPHAGEAL REFLUX DISEASE WITHOUT ESOPHAGITIS: ICD-10-CM

## 2024-03-01 DIAGNOSIS — R14.0 ABDOMINAL DISTENSION (GASEOUS): ICD-10-CM

## 2024-03-01 DIAGNOSIS — R19.7 DIARRHEA, UNSPECIFIED: ICD-10-CM

## 2024-03-01 PROCEDURE — 99213 OFFICE O/P EST LOW 20 MIN: CPT

## 2024-06-07 ENCOUNTER — OFFICE (AMBULATORY)
Dept: URBAN - METROPOLITAN AREA CLINIC 76 | Facility: CLINIC | Age: 64
End: 2024-06-07

## 2024-06-07 VITALS
DIASTOLIC BLOOD PRESSURE: 83 MMHG | SYSTOLIC BLOOD PRESSURE: 132 MMHG | HEIGHT: 69 IN | OXYGEN SATURATION: 95 % | HEART RATE: 103 BPM | WEIGHT: 202 LBS

## 2024-06-07 DIAGNOSIS — Z86.010 PERSONAL HISTORY OF COLONIC POLYPS: ICD-10-CM

## 2024-06-07 DIAGNOSIS — K58.0 IRRITABLE BOWEL SYNDROME WITH DIARRHEA: ICD-10-CM

## 2024-06-07 DIAGNOSIS — R14.0 ABDOMINAL DISTENSION (GASEOUS): ICD-10-CM

## 2024-06-07 DIAGNOSIS — K21.9 GASTRO-ESOPHAGEAL REFLUX DISEASE WITHOUT ESOPHAGITIS: ICD-10-CM

## 2024-06-07 PROCEDURE — 99213 OFFICE O/P EST LOW 20 MIN: CPT

## 2024-09-13 ENCOUNTER — OFFICE (AMBULATORY)
Dept: URBAN - METROPOLITAN AREA CLINIC 76 | Facility: CLINIC | Age: 64
End: 2024-09-13

## 2024-09-13 ENCOUNTER — OFFICE (AMBULATORY)
Age: 64
End: 2024-09-13

## 2025-04-02 ENCOUNTER — OFFICE (AMBULATORY)
Dept: URBAN - METROPOLITAN AREA CLINIC 76 | Facility: CLINIC | Age: 65
End: 2025-04-02

## 2025-04-02 VITALS
DIASTOLIC BLOOD PRESSURE: 74 MMHG | HEIGHT: 69 IN | SYSTOLIC BLOOD PRESSURE: 139 MMHG | WEIGHT: 204 LBS | HEART RATE: 94 BPM

## 2025-04-02 DIAGNOSIS — R10.9 UNSPECIFIED ABDOMINAL PAIN: ICD-10-CM

## 2025-04-02 DIAGNOSIS — K92.1 MELENA: ICD-10-CM

## 2025-04-02 DIAGNOSIS — R14.0 ABDOMINAL DISTENSION (GASEOUS): ICD-10-CM

## 2025-04-02 DIAGNOSIS — K58.0 IRRITABLE BOWEL SYNDROME WITH DIARRHEA: ICD-10-CM

## 2025-04-02 PROCEDURE — 99214 OFFICE O/P EST MOD 30 MIN: CPT

## 2025-04-02 RX ORDER — ESOMEPRAZOLE MAGNESIUM 40 MG/1
40 CAPSULE, DELAYED RELEASE PELLETS ORAL
Qty: 90 | Refills: 2 | Status: ACTIVE

## 2025-04-07 ENCOUNTER — TRANSCRIBE ORDERS (OUTPATIENT)
Dept: MAMMOGRAPHY | Facility: HOSPITAL | Age: 65
End: 2025-04-07
Payer: COMMERCIAL

## 2025-04-07 DIAGNOSIS — Z12.31 SCREENING MAMMOGRAM FOR BREAST CANCER: Primary | ICD-10-CM

## 2025-04-22 ENCOUNTER — HOSPITAL ENCOUNTER (OUTPATIENT)
Dept: MAMMOGRAPHY | Facility: HOSPITAL | Age: 65
Discharge: HOME | End: 2025-04-22
Admitting: FAMILY MEDICINE
Payer: COMMERCIAL

## 2025-04-22 DIAGNOSIS — Z12.31 SCREENING MAMMOGRAM FOR BREAST CANCER: ICD-10-CM

## 2025-04-22 PROCEDURE — 77063 BREAST TOMOSYNTHESIS BI: CPT

## 2025-04-22 PROCEDURE — 77067 SCR MAMMO BI INCL CAD: CPT

## 2025-05-21 ENCOUNTER — TRANSCRIBE ORDERS (OUTPATIENT)
Dept: ADMINISTRATIVE | Facility: HOSPITAL | Age: 65
End: 2025-05-21
Payer: COMMERCIAL

## 2025-05-21 DIAGNOSIS — R92.8 ABNORMAL MAMMOGRAM: Primary | ICD-10-CM

## 2025-05-23 ENCOUNTER — TRANSCRIBE ORDERS (OUTPATIENT)
Dept: ADMINISTRATIVE | Facility: HOSPITAL | Age: 65
End: 2025-05-23
Payer: COMMERCIAL

## 2025-05-23 DIAGNOSIS — R92.8 ABNORMAL MAMMOGRAM: Primary | ICD-10-CM

## 2025-05-28 ENCOUNTER — HOSPITAL ENCOUNTER (OUTPATIENT)
Dept: ULTRASOUND IMAGING | Facility: HOSPITAL | Age: 65
Discharge: HOME | End: 2025-05-28
Payer: COMMERCIAL

## 2025-05-28 ENCOUNTER — HOSPITAL ENCOUNTER (OUTPATIENT)
Dept: MAMMOGRAPHY | Facility: HOSPITAL | Age: 65
Discharge: HOME | End: 2025-05-28
Payer: COMMERCIAL

## 2025-05-28 ENCOUNTER — HOSPITAL ENCOUNTER (OUTPATIENT)
Dept: ULTRASOUND IMAGING | Facility: HOSPITAL | Age: 65
End: 2025-05-28
Payer: COMMERCIAL

## 2025-05-28 DIAGNOSIS — R92.8 ABNORMAL MAMMOGRAM: ICD-10-CM

## 2025-05-28 PROCEDURE — G0279 TOMOSYNTHESIS, MAMMO: HCPCS

## 2025-05-28 PROCEDURE — 76642 ULTRASOUND BREAST LIMITED: CPT

## 2025-05-28 PROCEDURE — 77065 DX MAMMO INCL CAD UNI: CPT

## 2025-06-06 ENCOUNTER — OFFICE (AMBULATORY)
Dept: URBAN - METROPOLITAN AREA CLINIC 76 | Facility: CLINIC | Age: 65
End: 2025-06-06

## 2025-08-01 ENCOUNTER — OFFICE VISIT (OUTPATIENT)
Dept: INTERNAL MEDICINE | Facility: CLINIC | Age: 65
End: 2025-08-01
Payer: COMMERCIAL

## 2025-08-01 VITALS
SYSTOLIC BLOOD PRESSURE: 136 MMHG | HEART RATE: 94 BPM | OXYGEN SATURATION: 97 % | DIASTOLIC BLOOD PRESSURE: 82 MMHG | HEIGHT: 68 IN | WEIGHT: 251 LBS | BODY MASS INDEX: 38.04 KG/M2

## 2025-08-01 DIAGNOSIS — M05.79 RHEUMATOID ARTHRITIS INVOLVING MULTIPLE SITES WITH POSITIVE RHEUMATOID FACTOR: Chronic | ICD-10-CM

## 2025-08-01 DIAGNOSIS — Z76.89 ENCOUNTER TO ESTABLISH CARE: Primary | ICD-10-CM

## 2025-08-01 DIAGNOSIS — E78.5 HYPERLIPIDEMIA, UNSPECIFIED HYPERLIPIDEMIA TYPE: Chronic | ICD-10-CM

## 2025-08-01 DIAGNOSIS — G89.4 CHRONIC PAIN DISORDER: Chronic | ICD-10-CM

## 2025-08-01 DIAGNOSIS — F41.9 ANXIETY: Chronic | ICD-10-CM

## 2025-08-01 DIAGNOSIS — I10 PRIMARY HYPERTENSION: Chronic | ICD-10-CM

## 2025-08-01 PROBLEM — E78.00 PURE HYPERCHOLESTEROLEMIA: Status: ACTIVE | Noted: 2025-08-01

## 2025-08-01 PROBLEM — M15.0 PRIMARY OSTEOARTHRITIS INVOLVING MULTIPLE JOINTS: Status: ACTIVE | Noted: 2025-08-01

## 2025-08-01 PROBLEM — J30.2 SEASONAL ALLERGIES: Status: ACTIVE | Noted: 2025-08-01

## 2025-08-01 PROBLEM — J30.2 SEASONAL ALLERGIC RHINITIS: Status: ACTIVE | Noted: 2022-05-11

## 2025-08-01 PROBLEM — A49.9 BACTERIAL INFECTION: Status: ACTIVE | Noted: 2025-08-01

## 2025-08-01 PROBLEM — Z12.11 SCREENING FOR COLORECTAL CANCER: Status: ACTIVE | Noted: 2025-08-01

## 2025-08-01 PROBLEM — K21.9 CHRONIC GERD: Status: ACTIVE | Noted: 2025-08-01

## 2025-08-01 PROBLEM — K58.0 IRRITABLE BOWEL SYNDROME WITH DIARRHEA: Status: ACTIVE | Noted: 2025-08-01

## 2025-08-01 PROBLEM — E66.3 OVERWEIGHT: Status: ACTIVE | Noted: 2025-08-01

## 2025-08-01 PROBLEM — Z12.12 SCREENING FOR COLORECTAL CANCER: Status: ACTIVE | Noted: 2025-08-01

## 2025-08-01 PROBLEM — K29.50 CHRONIC GASTRITIS: Status: ACTIVE | Noted: 2025-08-01

## 2025-08-01 PROBLEM — K59.00 CONSTIPATION: Status: ACTIVE | Noted: 2019-10-17

## 2025-08-01 PROBLEM — Z86.0100 HISTORY OF COLONIC POLYPS: Status: ACTIVE | Noted: 2025-08-01

## 2025-08-01 PROBLEM — M25.562 LEFT KNEE PAIN: Status: ACTIVE | Noted: 2025-08-01

## 2025-08-01 PROBLEM — F41.1 GENERALIZED ANXIETY DISORDER: Status: ACTIVE | Noted: 2022-05-11

## 2025-08-01 PROBLEM — Q60.2 RENAL AGENESIS AND DYSGENESIS: Status: ACTIVE | Noted: 2025-08-01

## 2025-08-01 PROBLEM — G47.30 SLEEP APNEA, UNSPECIFIED: Status: ACTIVE | Noted: 2025-08-01

## 2025-08-01 PROBLEM — E66.9 OBESITY: Status: ACTIVE | Noted: 2025-08-01

## 2025-08-01 PROBLEM — R92.8 ABNORMAL MAMMOGRAM: Status: ACTIVE | Noted: 2025-08-01

## 2025-08-01 PROBLEM — J31.0 PURULENT RHINITIS: Status: ACTIVE | Noted: 2025-08-01

## 2025-08-01 PROBLEM — R03.0 ELEVATED BLOOD PRESSURE READING: Status: ACTIVE | Noted: 2025-08-01

## 2025-08-01 PROBLEM — D12.0 BENIGN NEOPLASM OF CECUM: Status: ACTIVE | Noted: 2019-10-17

## 2025-08-01 PROBLEM — R91.1 LUNG NODULE: Status: ACTIVE | Noted: 2017-06-29

## 2025-08-01 PROBLEM — A49.8: Status: ACTIVE | Noted: 2025-08-01

## 2025-08-01 PROBLEM — E78.00 HIGH CHOLESTEROL: Status: ACTIVE | Noted: 2025-08-01

## 2025-08-01 PROBLEM — K57.92 DIVERTICULITIS: Status: ACTIVE | Noted: 2025-08-01

## 2025-08-01 PROBLEM — Q60.5 RENAL AGENESIS AND DYSGENESIS: Status: ACTIVE | Noted: 2025-08-01

## 2025-08-01 PROBLEM — J01.40 ACUTE PANSINUSITIS: Status: ACTIVE | Noted: 2025-08-01

## 2025-08-01 PROBLEM — R12 HEART BURN: Status: ACTIVE | Noted: 2025-08-01

## 2025-08-01 PROBLEM — R63.0 LOSS OF APPETITE: Status: ACTIVE | Noted: 2025-08-01

## 2025-08-01 PROBLEM — R05.9 COUGH: Status: ACTIVE | Noted: 2025-08-01

## 2025-08-01 PROBLEM — Z51.81 THERAPEUTIC DRUG MONITORING: Status: ACTIVE | Noted: 2025-08-01

## 2025-08-01 PROBLEM — M79.5: Status: ACTIVE | Noted: 2021-05-26

## 2025-08-01 RX ORDER — SITAGLIPTIN AND METFORMIN HYDROCHLORIDE 1000; 100 MG/1; MG/1
TABLET, FILM COATED, EXTENDED RELEASE ORAL
COMMUNITY

## 2025-08-01 RX ORDER — ESCITALOPRAM OXALATE 5 MG/1
5 TABLET ORAL DAILY
COMMUNITY

## 2025-08-01 RX ORDER — MELOXICAM 15 MG/1
15 TABLET ORAL DAILY
COMMUNITY
Start: 2025-07-29

## 2025-08-01 RX ORDER — AMLODIPINE AND BENAZEPRIL HYDROCHLORIDE 10; 20 MG/1; MG/1
1 CAPSULE ORAL DAILY
COMMUNITY

## 2025-08-01 RX ORDER — LAMOTRIGINE 100 MG/1
100 TABLET ORAL DAILY
COMMUNITY

## 2025-08-01 RX ORDER — CLONAZEPAM 0.5 MG
TABLET ORAL
COMMUNITY
Start: 2023-03-20 | End: 2025-08-07

## 2025-08-07 ENCOUNTER — OFFICE VISIT (OUTPATIENT)
Dept: INTERNAL MEDICINE | Facility: CLINIC | Age: 65
End: 2025-08-07
Payer: COMMERCIAL

## 2025-08-07 VITALS
OXYGEN SATURATION: 97 % | BODY MASS INDEX: 32.23 KG/M2 | WEIGHT: 212 LBS | DIASTOLIC BLOOD PRESSURE: 76 MMHG | SYSTOLIC BLOOD PRESSURE: 136 MMHG | HEART RATE: 105 BPM | TEMPERATURE: 97.1 F

## 2025-08-07 DIAGNOSIS — E78.00 PURE HYPERCHOLESTEROLEMIA: Chronic | ICD-10-CM

## 2025-08-07 DIAGNOSIS — I10 PRIMARY HYPERTENSION: Chronic | ICD-10-CM

## 2025-08-07 DIAGNOSIS — E66.09 CLASS 1 OBESITY DUE TO EXCESS CALORIES WITHOUT SERIOUS COMORBIDITY WITH BODY MASS INDEX (BMI) OF 32.0 TO 32.9 IN ADULT: Chronic | ICD-10-CM

## 2025-08-07 DIAGNOSIS — E78.5 HYPERLIPIDEMIA, UNSPECIFIED HYPERLIPIDEMIA TYPE: Chronic | ICD-10-CM

## 2025-08-07 DIAGNOSIS — Z00.00 ANNUAL PHYSICAL EXAM: Primary | ICD-10-CM

## 2025-08-07 DIAGNOSIS — E11.65 TYPE 2 DIABETES MELLITUS WITH HYPERGLYCEMIA, WITHOUT LONG-TERM CURRENT USE OF INSULIN: Chronic | ICD-10-CM

## 2025-08-07 DIAGNOSIS — Z72.0 TOBACCO USE: Chronic | ICD-10-CM

## 2025-08-07 DIAGNOSIS — Z13.29 SCREENING FOR THYROID DISORDER: ICD-10-CM

## 2025-08-07 DIAGNOSIS — E66.811 CLASS 1 OBESITY DUE TO EXCESS CALORIES WITHOUT SERIOUS COMORBIDITY WITH BODY MASS INDEX (BMI) OF 32.0 TO 32.9 IN ADULT: Chronic | ICD-10-CM

## 2025-08-07 DIAGNOSIS — Z12.2 SCREENING FOR LUNG CANCER: ICD-10-CM

## 2025-08-07 PROBLEM — R03.0 ELEVATED BLOOD PRESSURE READING: Status: RESOLVED | Noted: 2025-08-01 | Resolved: 2025-08-07

## 2025-08-08 LAB
ALBUMIN SERPL-MCNC: 4.4 G/DL (ref 3.5–5.2)
ALBUMIN/CREAT UR: 14 MG/G CREAT (ref 0–29)
ALBUMIN/GLOB SERPL: 1.6 G/DL
ALP SERPL-CCNC: 111 U/L (ref 39–117)
ALT SERPL-CCNC: 19 U/L (ref 1–33)
AST SERPL-CCNC: 19 U/L (ref 1–32)
BASOPHILS # BLD AUTO: 0.03 10*3/MM3 (ref 0–0.2)
BASOPHILS NFR BLD AUTO: 0.4 % (ref 0–1.5)
BILIRUB SERPL-MCNC: 0.3 MG/DL (ref 0–1.2)
BUN SERPL-MCNC: 15 MG/DL (ref 8–23)
BUN/CREAT SERPL: 17 (ref 7–25)
CALCIUM SERPL-MCNC: 9.8 MG/DL (ref 8.6–10.5)
CHLORIDE SERPL-SCNC: 102 MMOL/L (ref 98–107)
CHOLEST SERPL-MCNC: 233 MG/DL (ref 0–200)
CO2 SERPL-SCNC: 23.1 MMOL/L (ref 22–29)
CREAT SERPL-MCNC: 0.88 MG/DL (ref 0.57–1)
CREAT UR-MCNC: 184.7 MG/DL
EGFRCR SERPLBLD CKD-EPI 2021: 74.4 ML/MIN/1.73
EOSINOPHIL # BLD AUTO: 0.1 10*3/MM3 (ref 0–0.4)
EOSINOPHIL NFR BLD AUTO: 1.2 % (ref 0.3–6.2)
ERYTHROCYTE [DISTWIDTH] IN BLOOD BY AUTOMATED COUNT: 14.7 % (ref 12.3–15.4)
GLOBULIN SER CALC-MCNC: 2.8 GM/DL
GLUCOSE SERPL-MCNC: 114 MG/DL (ref 65–99)
HBA1C MFR BLD: 6.6 % (ref 4.8–5.6)
HCT VFR BLD AUTO: 41.3 % (ref 34–46.6)
HCV IGG SERPL QL IA: NON REACTIVE
HDLC SERPL-MCNC: 49 MG/DL (ref 40–60)
HGB BLD-MCNC: 13.7 G/DL (ref 12–15.9)
IMM GRANULOCYTES # BLD AUTO: 0.03 10*3/MM3 (ref 0–0.05)
IMM GRANULOCYTES NFR BLD AUTO: 0.4 % (ref 0–0.5)
LDLC SERPL CALC-MCNC: 165 MG/DL (ref 0–100)
LYMPHOCYTES # BLD AUTO: 2.28 10*3/MM3 (ref 0.7–3.1)
LYMPHOCYTES NFR BLD AUTO: 26.7 % (ref 19.6–45.3)
MCH RBC QN AUTO: 27.1 PG (ref 26.6–33)
MCHC RBC AUTO-ENTMCNC: 33.2 G/DL (ref 31.5–35.7)
MCV RBC AUTO: 81.6 FL (ref 79–97)
MICROALBUMIN UR-MCNC: 25.1 UG/ML
MONOCYTES # BLD AUTO: 0.51 10*3/MM3 (ref 0.1–0.9)
MONOCYTES NFR BLD AUTO: 6 % (ref 5–12)
NEUTROPHILS # BLD AUTO: 5.59 10*3/MM3 (ref 1.7–7)
NEUTROPHILS NFR BLD AUTO: 65.3 % (ref 42.7–76)
NRBC BLD AUTO-RTO: 0 /100 WBC (ref 0–0.2)
PLATELET # BLD AUTO: 322 10*3/MM3 (ref 140–450)
POTASSIUM SERPL-SCNC: 4.5 MMOL/L (ref 3.5–5.2)
PROT SERPL-MCNC: 7.2 G/DL (ref 6–8.5)
RBC # BLD AUTO: 5.06 10*6/MM3 (ref 3.77–5.28)
SODIUM SERPL-SCNC: 140 MMOL/L (ref 136–145)
TRIGL SERPL-MCNC: 104 MG/DL (ref 0–150)
TSH SERPL DL<=0.005 MIU/L-ACNC: 0.92 UIU/ML (ref 0.27–4.2)
VLDLC SERPL CALC-MCNC: 19 MG/DL (ref 5–40)
WBC # BLD AUTO: 8.54 10*3/MM3 (ref 3.4–10.8)

## 2025-08-11 ENCOUNTER — RESULTS FOLLOW-UP (OUTPATIENT)
Dept: INTERNAL MEDICINE | Facility: CLINIC | Age: 65
End: 2025-08-11
Payer: COMMERCIAL

## 2025-08-11 DIAGNOSIS — Z79.4 TYPE 2 DIABETES MELLITUS WITH HYPERGLYCEMIA, WITH LONG-TERM CURRENT USE OF INSULIN: Primary | ICD-10-CM

## 2025-08-11 DIAGNOSIS — E11.65 TYPE 2 DIABETES MELLITUS WITH HYPERGLYCEMIA, WITH LONG-TERM CURRENT USE OF INSULIN: Primary | ICD-10-CM

## 2025-08-11 RX ORDER — PRAVASTATIN SODIUM 20 MG
20 TABLET ORAL DAILY
Qty: 90 TABLET | Refills: 1 | Status: SHIPPED | OUTPATIENT
Start: 2025-08-11

## 2025-08-17 PROBLEM — F41.9 ANXIETY: Status: ACTIVE | Noted: 2025-08-17

## 2025-08-27 RX ORDER — PRAVASTATIN SODIUM 20 MG
20 TABLET ORAL DAILY
Qty: 90 TABLET | Refills: 1 | Status: SHIPPED | OUTPATIENT
Start: 2025-08-27

## 2025-08-27 RX ORDER — MELOXICAM 15 MG/1
15 TABLET ORAL DAILY
Qty: 30 TABLET | Refills: 0 | Status: SHIPPED | OUTPATIENT
Start: 2025-08-27

## 2025-08-27 RX ORDER — SITAGLIPTIN AND METFORMIN HYDROCHLORIDE 1000; 100 MG/1; MG/1
1 TABLET, FILM COATED, EXTENDED RELEASE ORAL DAILY
Qty: 90 TABLET | Refills: 0 | Status: SHIPPED | OUTPATIENT
Start: 2025-08-27

## 2025-08-27 RX ORDER — AMLODIPINE AND BENAZEPRIL HYDROCHLORIDE 10; 20 MG/1; MG/1
1 CAPSULE ORAL DAILY
Qty: 90 CAPSULE | Refills: 0 | Status: SHIPPED | OUTPATIENT
Start: 2025-08-27

## 2025-08-27 RX ORDER — ESOMEPRAZOLE MAGNESIUM 40 MG/1
40 CAPSULE, DELAYED RELEASE ORAL
Qty: 90 CAPSULE | Refills: 0 | Status: SHIPPED | OUTPATIENT
Start: 2025-08-27

## 2025-08-27 RX ORDER — FLUTICASONE PROPIONATE 50 MCG
2 SPRAY, SUSPENSION (ML) NASAL DAILY
Qty: 18.2 G | Refills: 6 | Status: SHIPPED | OUTPATIENT
Start: 2025-08-27

## 2025-08-27 RX ORDER — ELECTROLYTES/DEXTROSE
1 SOLUTION, ORAL ORAL DAILY
Qty: 90 TABLET | Refills: 1 | Status: SHIPPED | OUTPATIENT
Start: 2025-08-27

## 2025-08-27 RX ORDER — CETIRIZINE HYDROCHLORIDE 10 MG/1
10 TABLET ORAL DAILY
Qty: 90 TABLET | Refills: 1 | Status: SHIPPED | OUTPATIENT
Start: 2025-08-27

## (undated) DEVICE — GLV SURG BIOGEL LTX PF 8

## (undated) DEVICE — RECIPROCATING BLADE, DOUBLE SIDED, OFFSET  (70.0 X 0.64 X 12.6MM)

## (undated) DEVICE — Device

## (undated) DEVICE — DRSNG SURESITE123 4X10IN

## (undated) DEVICE — STRAP STIRUP WO/ RNG

## (undated) DEVICE — DUAL CUT SAGITTAL BLADE

## (undated) DEVICE — BNDG ELAS ELITE V/CLOSE 6IN 5YD LF STRL

## (undated) DEVICE — APPL CHLORAPREP W/TINT 26ML ORNG

## (undated) DEVICE — PK KN TOTL 40

## (undated) DEVICE — ADHS SKIN DERMABOND TOP ADVANCED

## (undated) DEVICE — IMPLANTABLE DEVICE
Type: IMPLANTABLE DEVICE | Site: KNEE | Status: NON-FUNCTIONAL
Brand: PERSONA™
Removed: 2019-07-16

## (undated) DEVICE — DRSNG TELFA PAD NONADH STR 1S 3X8IN

## (undated) DEVICE — SUT VICRYL 1 CT1 27IN  JJ40G

## (undated) DEVICE — DRAPE,U/ SHT,SPLIT,PLAS,STERIL: Brand: MEDLINE

## (undated) DEVICE — BNDG ELAS ELITE V/CLOSE 4IN 5YD LF STRL

## (undated) DEVICE — NDL SPINE 18G 31/2IN PNK

## (undated) DEVICE — GLV SURG PREMIERPRO ORTHO LTX PF SZ8 BRN

## (undated) DEVICE — PIN DRL NOHEAD TROC 3.2X75MM BX/4

## (undated) DEVICE — DRAPE,REIN 53X77,STERILE: Brand: MEDLINE

## (undated) DEVICE — GLV SURG TRIUMPH CLASSIC PF LTX 8.5 STRL